# Patient Record
Sex: FEMALE | Race: BLACK OR AFRICAN AMERICAN | Employment: FULL TIME | URBAN - METROPOLITAN AREA
[De-identification: names, ages, dates, MRNs, and addresses within clinical notes are randomized per-mention and may not be internally consistent; named-entity substitution may affect disease eponyms.]

---

## 2022-03-03 ENCOUNTER — HOSPITAL ENCOUNTER (OUTPATIENT)
Dept: GENERAL RADIOLOGY | Age: 32
Discharge: HOME | End: 2022-03-03
Payer: COMMERCIAL

## 2022-03-03 ENCOUNTER — OFFICE VISIT (OUTPATIENT)
Dept: INTERNAL MEDICINE CLINIC | Age: 32
End: 2022-03-03
Payer: COMMERCIAL

## 2022-03-03 VITALS
RESPIRATION RATE: 17 BRPM | HEIGHT: 66 IN | DIASTOLIC BLOOD PRESSURE: 82 MMHG | BODY MASS INDEX: 47.09 KG/M2 | HEART RATE: 89 BPM | WEIGHT: 293 LBS | TEMPERATURE: 97.7 F | OXYGEN SATURATION: 100 % | SYSTOLIC BLOOD PRESSURE: 134 MMHG

## 2022-03-03 DIAGNOSIS — N64.89 BILATERAL PENDULOUS BREASTS: ICD-10-CM

## 2022-03-03 DIAGNOSIS — G89.29 CHRONIC RIGHT SHOULDER PAIN: ICD-10-CM

## 2022-03-03 DIAGNOSIS — G89.29 CHRONIC BILATERAL THORACIC BACK PAIN: ICD-10-CM

## 2022-03-03 DIAGNOSIS — R53.82 CHRONIC FATIGUE: ICD-10-CM

## 2022-03-03 DIAGNOSIS — R53.82 CHRONIC FATIGUE: Primary | ICD-10-CM

## 2022-03-03 DIAGNOSIS — F43.23 ADJUSTMENT DISORDER WITH MIXED ANXIETY AND DEPRESSED MOOD: ICD-10-CM

## 2022-03-03 DIAGNOSIS — G43.839 INTRACTABLE MENSTRUAL MIGRAINE WITHOUT STATUS MIGRAINOSUS: ICD-10-CM

## 2022-03-03 DIAGNOSIS — G56.01 RIGHT CARPAL TUNNEL SYNDROME: ICD-10-CM

## 2022-03-03 DIAGNOSIS — M25.511 CHRONIC RIGHT SHOULDER PAIN: ICD-10-CM

## 2022-03-03 DIAGNOSIS — M54.6 CHRONIC BILATERAL THORACIC BACK PAIN: ICD-10-CM

## 2022-03-03 DIAGNOSIS — Z13.220 SCREENING FOR LIPID DISORDERS: ICD-10-CM

## 2022-03-03 DIAGNOSIS — E66.01 SEVERE OBESITY (BMI >= 40) (HCC): ICD-10-CM

## 2022-03-03 DIAGNOSIS — Z76.89 ENCOUNTER TO ESTABLISH CARE: ICD-10-CM

## 2022-03-03 DIAGNOSIS — R63.5 WEIGHT GAIN: ICD-10-CM

## 2022-03-03 PROBLEM — G43.119 INTRACTABLE MIGRAINE WITH AURA WITHOUT STATUS MIGRAINOSUS: Status: ACTIVE | Noted: 2022-03-03

## 2022-03-03 PROCEDURE — 99204 OFFICE O/P NEW MOD 45 MIN: CPT | Performed by: NURSE PRACTITIONER

## 2022-03-03 PROCEDURE — 72072 X-RAY EXAM THORAC SPINE 3VWS: CPT

## 2022-03-03 RX ORDER — IBUPROFEN 800 MG/1
800 TABLET ORAL
Qty: 40 TABLET | Refills: 2 | Status: SHIPPED | OUTPATIENT
Start: 2022-03-03

## 2022-03-03 RX ORDER — VALACYCLOVIR HYDROCHLORIDE 500 MG/1
TABLET, FILM COATED ORAL
COMMUNITY
End: 2022-03-03

## 2022-03-03 RX ORDER — TOPIRAMATE 50 MG/1
50 TABLET, FILM COATED ORAL 2 TIMES DAILY
Qty: 60 TABLET | Refills: 2 | Status: SHIPPED | OUTPATIENT
Start: 2022-03-03 | End: 2022-06-01

## 2022-03-03 RX ORDER — ETONOGESTREL 68 MG/1
68 IMPLANT SUBCUTANEOUS
COMMUNITY

## 2022-03-03 RX ORDER — PHENTERMINE HYDROCHLORIDE 37.5 MG/1
TABLET ORAL
COMMUNITY
End: 2022-03-03

## 2022-03-03 RX ORDER — TOPIRAMATE 50 MG/1
TABLET, FILM COATED ORAL
COMMUNITY
End: 2022-03-03 | Stop reason: SDUPTHER

## 2022-03-03 RX ORDER — SERTRALINE HYDROCHLORIDE 25 MG/1
25 TABLET, FILM COATED ORAL
Qty: 30 TABLET | Refills: 2 | Status: SHIPPED | OUTPATIENT
Start: 2022-03-03 | End: 2022-03-28 | Stop reason: SDUPTHER

## 2022-03-03 RX ORDER — ALBUTEROL SULFATE 90 UG/1
AEROSOL, METERED RESPIRATORY (INHALATION)
COMMUNITY
End: 2022-03-03

## 2022-03-03 RX ORDER — CHLORTHALIDONE 25 MG/1
TABLET ORAL
Status: CANCELLED | OUTPATIENT
Start: 2022-03-03

## 2022-03-03 RX ORDER — CHLORTHALIDONE 25 MG/1
TABLET ORAL
COMMUNITY
End: 2022-03-03

## 2022-03-03 RX ORDER — VALACYCLOVIR HYDROCHLORIDE 500 MG/1
TABLET, FILM COATED ORAL
Status: CANCELLED | OUTPATIENT
Start: 2022-03-03

## 2022-03-03 NOTE — PROGRESS NOTES
Pt is here for   Chief Complaint   Patient presents with    New Patient     here to establish care     Weight Gain     pt states that she gained over 50 pounds since moving here in Aug     1. Have you been to the ER, urgent care clinic since your last visit? Hospitalized since your last visit? No    2. Have you seen or consulted any other health care providers outside of the [address] [place] [address] since your last visit? Include any pap smears or colon screening.  No    Denies pain at this time

## 2022-03-03 NOTE — PROGRESS NOTES
Avelina Farooq (: 1990) is a 33 y.o. female, new patient, here for evaluation of the following chief complaint(s):  New Patient (here to establish care ) and Weight Gain (pt states that she gained over 50 pounds since moving here in Aug)       ASSESSMENT/PLAN:  Below is the assessment and plan developed based on review of pertinent history, physical exam, labs, studies, and medications. 1. Chronic fatigue  -     MAGNESIUM; Future  -     VITAMIN B12 & FOLATE; Future  -     VITAMIN D, 25 HYDROXY; Future  -     T4 (THYROXINE); Future  -     TSH 3RD GENERATION; Future  -     HEMOGLOBIN A1C WITH EAG; Future  -     CBC WITH AUTOMATED DIFF; Future  -     METABOLIC PANEL, COMPREHENSIVE; Future  2. Severe obesity (BMI >= 40) (HCC)  -     topiramate (TOPAMAX) 50 mg tablet; Take 1 Tablet by mouth two (2) times a day., Normal, Disp-60 Tablet, R-2  -     REFERRAL TO WEIGHT LOSS  3. Encounter to establish care  4. Screening for lipid disorders  -     LIPID PANEL; Future  5. Chronic bilateral thoracic back pain  -     XR SPINE THORAC 3 V; Future  6. Right carpal tunnel syndrome  -     XR SPINE THORAC 3 V; Future  7. Weight gain  8. Intractable menstrual migraine without status migrainosus  -     topiramate (TOPAMAX) 50 mg tablet; Take 1 Tablet by mouth two (2) times a day., Normal, Disp-60 Tablet, R-2  -     ibuprofen (MOTRIN) 800 mg tablet; Take 1 Tablet by mouth every eight (8) hours as needed for Pain., Normal, Disp-40 Tablet, R-2  -     MAGNESIUM; Future  -     VITAMIN D, 25 HYDROXY; Future  -     TSH 3RD GENERATION; Future  9. Chronic right shoulder pain  -     ibuprofen (MOTRIN) 800 mg tablet; Take 1 Tablet by mouth every eight (8) hours as needed for Pain., Normal, Disp-40 Tablet, R-2  10. Adjustment disorder with mixed anxiety and depressed mood  -     sertraline (Zoloft) 25 mg tablet;  Take 1 Tablet by mouth nightly., Normal, Disp-30 Tablet, R-2  -     REFERRAL TO PSYCHOLOGY  11. Bilateral pendulous breasts      Return for VV- ADJ dis med/counseling start. Pt asked to complete follow by next visit: lose weight, increase physical activity, referred to [facility] and for counseling. Trial of zoloft. Resumed topamax to incite wt loss and mitigate migraine HAs. SUBJECTIVE/OBJECTIVE:  HPI    Pt here to establish care. 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 Has no social network here in [place], friends live in other states. Feels change in mood, cries more now, and eating more. Has gained ~ 50 lbs since moving. Has lack of motivation lately. For past few months has worked out 2 times weekly, but weight training only. Has lost 80 lbs in the past, but unable to get motivated now, feels so tired most days. Took adipex and chlorthalidone for a few months to aide in wt loss. Also take topamax for migraine HAs, but improved greatly after Nexplanon inserted. Has numbness and tingling, but told at one pt from [facility] she has right carparl tunnel. Seen at that time for right shoulder/neck pain related to large pendulous breast. Wore size H at the time. MRI of CSP done with xray and told no issues noted. However feels pain  In mid back and right shoulder is solely due to weight of breast. Was in process of approval for breast reduction before COVID. Advised to lose wt, but recalls only dropping one cup size with 80 lb wt loss. Feels breast are bigger and heavier now, has NOT gotten new bras yet.   3 most recent PHQ Screens 3/3/2022   Little interest or pleasure in doing things Nearly every day   Feeling down, depressed, irritable, or hopeless More than half the days   Total Score PHQ 2 5   Trouble falling or staying asleep, or sleeping too much More than half the days   Feeling tired or having little energy Nearly every day   Poor appetite, weight loss, or overeating Nearly every day   Feeling bad about yourself - or that you are a failure or have let yourself or your family down Nearly every day   Trouble concentrating on things such as school, work, reading, or watching TV Nearly every day   Moving or speaking so slowly that other people could have noticed; or the opposite being so fidgety that others notice More than half the days   Thoughts of being better off dead, or hurting yourself in some way Not at all   PHQ 9 Score 21   How difficult have these problems made it for you to do your work, take care of your home and get along with others Very difficult           Review of Systems  Constitutional: negative for fevers, chills, anorexia and weight loss  Respiratory:  negative for cough, hemoptysis, dyspnea, and wheezing  CV:   negative for chest pain, palpitations, and lower extremity edema  GI:   negative for nausea, vomiting, diarrhea, abdominal pain, and melena  Endo:               negative for polyuria,polydipsia,polyphagia, and heat intolerance  Genitourinary: negative for frequency, urgency, dysuria, retention, and hematuria  Integument:  negative for rash, ulcerations, and pruritus  Hematologic:  negative for easy bruising and bleeding  Musculoskel: negative for arthralgias, muscle weakness,and joint pain/swelling  Neurological:  negative for headaches, dizziness, vertigo,and memory/gait problems  Behavl/Psych: negative for feelings of anxiety, depression, suicide, and mood changes    Visit Vitals  /82 (BP 1 Location: Right upper arm, BP Patient Position: Sitting, BP Cuff Size: Thigh)   Pulse 89   Temp 97.7 °F (36.5 °C) (Temporal)   Resp 17   Ht 5' 6\" (1.676 m)   Wt 300 lb (136.1 kg)   LMP 03/01/2022 (Exact Date)   SpO2 100%   BMI 48.42 kg/m²       Wt Readings from Last 3 Encounters:   03/03/22 300 lb (136.1 kg)         Physical Exam:   General appearance - alert, well appearing, and in no distress. Mental status - A/O x 4,sad mood and affect. +tearful. Chest - CTA. Symmetric chest rise. No wheezing. No distress. Heart - Normal rate & rhythm.  Normal S1 & S2. No MGR. Abdomen- Soft, round. Non-distended, NT. No pulsatile masses or hernias. Ext-  No pedal edema, clubbing, or cyanosis. Skin-Warm and dry. No hyperpigmentation, ulcerations, or suspicious lesions. Neuro - Normal speech, no focal findings or movement disorder. Normal strength, gait, and muscle tone. An electronic signature was used to authenticate this note.   -- Eyad Altamirano, NP

## 2022-03-03 NOTE — PATIENT INSTRUCTIONS
Adjustment Disorder: Care Instructions  Your Care Instructions     Adjustment disorder means that you have emotional or behavioral problems because of stress. But your response to the stress is far more severe than a normal response. It is severe enough to affect your work or social life and may cause depression and physical pains and problems. Events that may cause this response can include a divorce, money problems, or starting school or a new job. It might be anything that causes some stress. This disorder is most often a short-term problem. It happens within 3 months of the stressful event or change. If the response lasts longer than 6 months after the event ends, you may have a more serious disorder. Follow-up care is a key part of your treatment and safety. Be sure to make and go to all appointments, and call your doctor if you are having problems. It's also a good idea to know your test results and keep a list of the medicines you take. How can you care for yourself at home? · Go to all counseling sessions. Do not skip any because you are feeling better. · If your doctor prescribed medicines, take them exactly as prescribed. Call your doctor if you think you are having a problem with your medicine. You will get more details on the specific medicines your doctor prescribes. · Discuss the causes of your stress with a good friend or family member. Or you can join a support group for people with similar problems. Talking to others sometimes relieves stress. · Get at least 30 minutes of exercise on most days of the week. Walking is a good choice. You also may want to do other activities, such as running, swimming, cycling, or playing tennis or team sports. Relaxation techniques  Do relaxation exercises 10 to 20 minutes a day. You can play soothing, relaxing music while you do them, if you wish. · Tell others in your house that you are going to do your relaxation exercises.  Ask them not to disturb you.  · Find a comfortable, quiet place. · Lie down on your back, or sit with your back straight. · Focus on your breathing. Make it slow and steady. · Breathe in through your nose. Breathe out through either your nose or mouth. · Breathe deeply, filling up the area between your navel and your rib cage. Breathe so that your belly goes up and down. · Do not hold your breath. · Breathe like this for 5 to 10 minutes. Notice the feeling of calmness throughout your whole body. As you continue to breathe slowly and deeply, relax by doing these next steps for another 5 to 10 minutes:  · Tighten and relax each muscle group in your body. Start at your toes, and work your way up to your head. · Imagine your muscle groups relaxing and getting heavy. · Empty your mind of all thoughts. · Let yourself relax more and more deeply. · Be aware of the state of calmness that surrounds you. · When your relaxation time is over, you can bring yourself back to alertness by moving your fingers and toes. Then move your hands and feet. And then move your entire body. Sometimes people fall asleep during relaxation. But they most often wake up soon. · Always give yourself time to return to full alertness before you drive a car. Wait to do anything that might cause an accident if you are not fully alert. Never play a relaxation tape while you drive a car. When should you call for help? Call 911 anytime you think you may need emergency care. For example, call if:    · You feel you cannot stop from hurting yourself or someone else. Keep the numbers for these national suicide hotlines: 0-061-234-TALK (3-382-793-394-596-4487) and 2-557-DPSSIKL (1-472.960.7390). If you or someone you know talks about suicide or feeling hopeless, get help right away.    Watch closely for changes in your health, and be sure to contact your doctor if:    · You have new anxiety, or your anxiety gets worse.     · You have been feeling sad, depressed, or hopeless or have lost interest in things that you usually enjoy.     · You do not get better as expected. Where can you learn more? Go to http://www.gray.com/  Enter R087 in the search box to learn more about \"Adjustment Disorder: Care Instructions. \"  Current as of: June 16, 2021               Content Version: 13.0  © 2432-6403 [employer]. Care instructions adapted under license by [employer] (which disclaims liability or warranty for this information). If you have questions about a medical condition or this instruction, always ask your healthcare professional. [place] [address] any warranty or liability for your use of this information.

## 2022-03-04 LAB
25(OH)D3 SERPL-MCNC: 45.3 NG/ML (ref 30–100)
ALBUMIN SERPL-MCNC: 3.5 G/DL (ref 3.5–5)
ALBUMIN/GLOB SERPL: 0.8 {RATIO} (ref 1.1–2.2)
ALP SERPL-CCNC: 75 U/L (ref 45–117)
ALT SERPL-CCNC: 24 U/L (ref 12–78)
ANION GAP SERPL CALC-SCNC: 4 MMOL/L (ref 5–15)
AST SERPL-CCNC: 16 U/L (ref 15–37)
BASOPHILS # BLD: 0 K/UL (ref 0–0.1)
BASOPHILS NFR BLD: 1 % (ref 0–1)
BILIRUB SERPL-MCNC: 0.2 MG/DL (ref 0.2–1)
BUN SERPL-MCNC: 17 MG/DL (ref 6–20)
BUN/CREAT SERPL: 16 (ref 12–20)
CALCIUM SERPL-MCNC: 9.6 MG/DL (ref 8.5–10.1)
CHLORIDE SERPL-SCNC: 107 MMOL/L (ref 97–108)
CHOLEST SERPL-MCNC: 179 MG/DL
CO2 SERPL-SCNC: 26 MMOL/L (ref 21–32)
COMMENT, HOLDF: NORMAL
CREAT SERPL-MCNC: 1.06 MG/DL (ref 0.55–1.02)
DIFFERENTIAL METHOD BLD: ABNORMAL
EOSINOPHIL # BLD: 0.2 K/UL (ref 0–0.4)
EOSINOPHIL NFR BLD: 4 % (ref 0–7)
ERYTHROCYTE [DISTWIDTH] IN BLOOD BY AUTOMATED COUNT: 14.6 % (ref 11.5–14.5)
EST. AVERAGE GLUCOSE BLD GHB EST-MCNC: 111 MG/DL
FOLATE SERPL-MCNC: 43.3 NG/ML (ref 5–21)
GLOBULIN SER CALC-MCNC: 4.2 G/DL (ref 2–4)
GLUCOSE SERPL-MCNC: 98 MG/DL (ref 65–100)
HBA1C MFR BLD: 5.5 % (ref 4–5.6)
HCT VFR BLD AUTO: 39.6 % (ref 35–47)
HDLC SERPL-MCNC: 71 MG/DL
HDLC SERPL: 2.5 {RATIO} (ref 0–5)
HGB BLD-MCNC: 12.4 G/DL (ref 11.5–16)
IMM GRANULOCYTES # BLD AUTO: 0 K/UL (ref 0–0.04)
IMM GRANULOCYTES NFR BLD AUTO: 0 % (ref 0–0.5)
LDLC SERPL CALC-MCNC: 96.4 MG/DL (ref 0–100)
LYMPHOCYTES # BLD: 1.7 K/UL (ref 0.8–3.5)
LYMPHOCYTES NFR BLD: 39 % (ref 12–49)
MAGNESIUM SERPL-MCNC: 2.1 MG/DL (ref 1.6–2.4)
MCH RBC QN AUTO: 25.6 PG (ref 26–34)
MCHC RBC AUTO-ENTMCNC: 31.3 G/DL (ref 30–36.5)
MCV RBC AUTO: 81.8 FL (ref 80–99)
MONOCYTES # BLD: 0.3 K/UL (ref 0–1)
MONOCYTES NFR BLD: 6 % (ref 5–13)
NEUTS SEG # BLD: 2.2 K/UL (ref 1.8–8)
NEUTS SEG NFR BLD: 50 % (ref 32–75)
NRBC # BLD: 0 K/UL (ref 0–0.01)
NRBC BLD-RTO: 0 PER 100 WBC
PLATELET # BLD AUTO: 281 K/UL (ref 150–400)
PMV BLD AUTO: 12.2 FL (ref 8.9–12.9)
POTASSIUM SERPL-SCNC: 4.3 MMOL/L (ref 3.5–5.1)
PROT SERPL-MCNC: 7.7 G/DL (ref 6.4–8.2)
RBC # BLD AUTO: 4.84 M/UL (ref 3.8–5.2)
SAMPLES BEING HELD,HOLD: NORMAL
SODIUM SERPL-SCNC: 137 MMOL/L (ref 136–145)
T4 SERPL-MCNC: 11.4 UG/DL (ref 4.8–13.9)
TRIGL SERPL-MCNC: 58 MG/DL (ref ?–150)
TSH SERPL DL<=0.05 MIU/L-ACNC: 1.35 UIU/ML (ref 0.36–3.74)
VIT B12 SERPL-MCNC: 1800 PG/ML (ref 193–986)
VLDLC SERPL CALC-MCNC: 11.6 MG/DL
WBC # BLD AUTO: 4.4 K/UL (ref 3.6–11)

## 2022-03-07 NOTE — PROGRESS NOTES
NML/Stable labs, no changes. We can discuss at the next OV, if pt would like.        Thanks, [employer], KARLEYC.

## 2022-03-18 PROBLEM — G89.29 CHRONIC RIGHT SHOULDER PAIN: Status: ACTIVE | Noted: 2022-03-03

## 2022-03-18 PROBLEM — E66.01 SEVERE OBESITY (BMI >= 40) (HCC): Status: ACTIVE | Noted: 2022-03-03

## 2022-03-18 PROBLEM — M25.511 CHRONIC RIGHT SHOULDER PAIN: Status: ACTIVE | Noted: 2022-03-03

## 2022-03-18 PROBLEM — G89.29 CHRONIC BILATERAL THORACIC BACK PAIN: Status: ACTIVE | Noted: 2022-03-03

## 2022-03-18 PROBLEM — R53.82 CHRONIC FATIGUE: Status: ACTIVE | Noted: 2022-03-03

## 2022-03-18 PROBLEM — M54.6 CHRONIC BILATERAL THORACIC BACK PAIN: Status: ACTIVE | Noted: 2022-03-03

## 2022-03-18 PROBLEM — G56.01 RIGHT CARPAL TUNNEL SYNDROME: Status: ACTIVE | Noted: 2022-03-03

## 2022-03-18 PROBLEM — F43.23 ADJUSTMENT DISORDER WITH MIXED ANXIETY AND DEPRESSED MOOD: Status: ACTIVE | Noted: 2022-03-03

## 2022-03-19 PROBLEM — G43.119 INTRACTABLE MIGRAINE WITH AURA WITHOUT STATUS MIGRAINOSUS: Status: ACTIVE | Noted: 2022-03-03

## 2022-03-19 PROBLEM — N64.89 BILATERAL PENDULOUS BREASTS: Status: ACTIVE | Noted: 2022-03-03

## 2022-03-19 PROBLEM — G43.839 INTRACTABLE MENSTRUAL MIGRAINE WITHOUT STATUS MIGRAINOSUS: Status: ACTIVE | Noted: 2022-03-03

## 2022-03-28 ENCOUNTER — VIRTUAL VISIT (OUTPATIENT)
Dept: INTERNAL MEDICINE CLINIC | Age: 32
End: 2022-03-28
Payer: COMMERCIAL

## 2022-03-28 DIAGNOSIS — E66.01 SEVERE OBESITY (BMI >= 40) (HCC): ICD-10-CM

## 2022-03-28 DIAGNOSIS — F43.23 ADJUSTMENT DISORDER WITH MIXED ANXIETY AND DEPRESSED MOOD: Primary | ICD-10-CM

## 2022-03-28 DIAGNOSIS — R53.82 CHRONIC FATIGUE: ICD-10-CM

## 2022-03-28 PROCEDURE — 99214 OFFICE O/P EST MOD 30 MIN: CPT | Performed by: NURSE PRACTITIONER

## 2022-03-28 RX ORDER — SERTRALINE HYDROCHLORIDE 50 MG/1
50 TABLET, FILM COATED ORAL
Qty: 90 TABLET | Refills: 3 | Status: SHIPPED | OUTPATIENT
Start: 2022-03-28 | End: 2022-06-01 | Stop reason: ALTCHOICE

## 2022-03-28 NOTE — PROGRESS NOTES
Pt is here for   Chief Complaint   Patient presents with    Follow-up     Adj dis med/counseling start DOXY. -[phone]     1. Have you been to the ER, urgent care clinic since your last visit? Hospitalized since your last visit? No    2. Have you seen or consulted any other health care providers outside of the [address] [place] [address] since your last visit? Include any pap smears or colon screening.  No    Denies pain at this time

## 2022-03-28 NOTE — Clinical Note
Morning, it seems this pt was not contacted (per her report) for orientation. Could you please reach out to her?

## 2022-03-28 NOTE — PATIENT INSTRUCTIONS
Learning About Guided Imagery for Stress  What are guided imagery and stress? Stress is what you feel when you have to handle more than you're used to. A lot of things can cause stress. You may feel stress when you go on a job interview, take a test, or run a race. This kind of short-term stress is normal and even useful. It can help you if you need to work hard or react quickly. Stress also can last a long time. Long-term stress is caused by stressful situations or events. Examples include long-term health problems, ongoing problems at work, and conflicts in your family. Long-term stress can harm your health. Guided imagery is a technique of directed thoughts and suggestions that guide your mind toward a relaxed, focused state. This technique helps you use your mind to take you to a calm, peaceful place. You can use it to relax, which can lower blood pressure and reduce other problems related to stress. How does guided imagery help to relieve stress? Because of the way the mind and body are connected, guided imagery can make you feel like you are experiencing something just by imagining it. You can achieve a relaxed state when you imagine all the details of a safe, comfortable place, such as a beach or a garden. This relaxed state may help you feel more in control of your emotions and thought processes. Feeling in control may improve your attitude, health, and sense of well-being. How do you do guided imagery? You can use a smartphone ariela or a video to lead you through the process. You use all of your senses in guided imagery. For example, if you want a tropical setting, you can imagine the warm breeze on your skin, the bright blue of the water, the sound of the surf, the sweet scent of tropical flowers, and the taste of coconut. Imagining those things can make you actually feel like you're there. But you don't have to imagine the tropics to feel peace.  Guided imagery can take you to your own restful place. To give guided imagery a try, follow these steps:  1. Lean back comfortably in your chair. If you can, close your eyes. Put your arms on the armrests, or fold your hands in your lap. 2. Take a deep breath through your nose. Breathe in slowly, and then let the air out completely through your mouth. 3. Do it again slowly. Keep breathing like this. Gather up any tension in your body, and send it out with every breath. 4. Let a feeling of warmth spread from your lungs to your neck and head, down your arms to your fingertips, through your body and into your legs, all the way down to your toes. Stay this way for a moment. 5. Now imagine a pleasant day. You're at a park or at a place you've visited in the past where you felt at peace. 6. In your mind's eye, look at what lies in front of you. Maybe you see the sun, filtered through trees. Maybe clouds are drifting by.  7. Look to one side, and then the other. Notice the feel of the air around you. Notice how it feels on your face and on your arms. 8. Stay here for a while. Let it become real for you. Follow-up care is a key part of your treatment and safety. Be sure to make and go to all appointments, and call your doctor if you are having problems. It's also a good idea to know your test results and keep a list of the medicines you take. Where can you learn more? Go to http://www.gray.com/  Enter N291 in the search box to learn more about \"Learning About Guided Imagery for Stress. \"  Current as of: June 16, 2021               Content Version: 13.2  © 1266-2345 [employer]. Care instructions adapted under license by [employer] (which disclaims liability or warranty for this information). If you have questions about a medical condition or this instruction, always ask your healthcare professional. [address] any warranty or liability for your use of this information.

## 2022-03-28 NOTE — PROGRESS NOTES
Steven Solomon is a 33 y.o. female established patient, here for evaluation of the following chief complaint(s):   Follow-up (Adj dis med/counseling start DOXY. -[phone])          Assessment & Plan:   Diagnoses and all orders for this visit:    1. Adjustment disorder with mixed anxiety and depressed mood  -     REFERRAL TO PSYCHOLOGY  -     sertraline (Zoloft) 50 mg tablet; Take 1 Tablet by mouth nightly. 2. Chronic fatigue    3. Severe obesity (BMI >= 40) ([facility])      Follow-up and Dispositions    · Return in about 4 weeks (around 4/25/2022) for VV- ADJ/MDD fu. Needs VV with Ethelene Debra ASAP please. Sherryle Moder Routing History           We discussed the expected course, resolution and complications of the diagnosis(es) in detail. Medication risks, benefits, costs, interactions, and alternatives were discussed as indicated. I advised her to contact the office if her condition worsens, changes or fails to improve as anticipated. She expressed understanding with the diagnosis(es) and plan. Specific pt instructions until next visit: start counseling with Ethelene Debra and INCREASED Zoloft to 50 mg. Sent msg to [facility] since pt reports NOT being contacted for orientation since last visit. Subjective:   Steven Solomon is a 33 y.o. female who was seen for Follow-up (Adj dis med/counseling start DOXY. -[phone])      Pt presents to f/u adjustment disorder. Taking Zoloft 25 mg. Denies side effects from medication. Feels the same, but crying less, sleeping more on off days however.  Has NOT called for therapy appt, has noticed lack of motivation to take care of home and grocery shop.   3 most recent [facility] 3/28/2022   Little interest or pleasure in doing things More than half the days   Feeling down, depressed, irritable, or hopeless More than half the days   Total Score PHQ 2 4   Trouble falling or staying asleep, or sleeping too much Several days   Feeling tired or having little energy Nearly every day   Poor appetite, weight loss, or overeating Nearly every day   Feeling bad about yourself - or that you are a failure or have let yourself or your family down More than half the days   Trouble concentrating on things such as school, work, reading, or watching TV More than half the days   Moving or speaking so slowly that other people could have noticed; or the opposite being so fidgety that others notice More than half the days   Thoughts of being better off dead, or hurting yourself in some way Not at all   PHQ 9 Score 17   How difficult have these problems made it for you to do your work, take care of your home and get along with others Very difficult         Patient Active Problem List    Diagnosis Date Noted    Bilateral pendulous breasts 03/03/2022    Adjustment disorder with mixed anxiety and depressed mood 03/03/2022    Chronic right shoulder pain 03/03/2022    Intractable menstrual migraine without status migrainosus 03/03/2022    Intractable migraine with aura without status migrainosus 03/03/2022    Right carpal tunnel syndrome 03/03/2022    Chronic bilateral thoracic back pain 03/03/2022    Chronic fatigue 03/03/2022    Severe obesity (BMI >= 40) ([facility] [address] 03/03/2022     Current Outpatient Medications   Medication Sig Dispense Refill    sertraline (Zoloft) 50 mg tablet Take 1 Tablet by mouth nightly. 90 Tablet 3    etonogestreL (Nexplanon) 68 mg impl 68 mg.  topiramate (TOPAMAX) 50 mg tablet Take 1 Tablet by mouth two (2) times a day. 60 Tablet 2    ibuprofen (MOTRIN) 800 mg tablet Take 1 Tablet by mouth every eight (8) hours as needed for Pain. 40 Tablet 2     Allergies   Allergen Reactions    Cat Dander Hives     History reviewed. No pertinent past medical history. History reviewed. No pertinent surgical history. Review of Systems   Constitutional: Negative for fever and malaise/fatigue. Eyes: Negative for blurred vision. Respiratory: Negative for cough and shortness of breath. Cardiovascular: Negative for chest pain and leg swelling. Neurological: Negative for dizziness, weakness and headaches. Objective:   Vital Signs: (As obtained by patient/caregiver at home)  Visit Vitals  LMP 03/01/2022 (Exact Date)              Physical Exam:  General appearance - alert, well  appearing, and in mild distress. Mental status - A/O x 4,sad mood and FLAT affect. +tearful at end  Eyes- trace periorbital edema, drainage, or irritation noted. Nose- no obvious drainage or swelling. Throat- no obvious swelling, goiter, or notable lymphadenopathy  Chest - Symmetric chest rise. No wheezing or coughing. No distress. Skin- normal skin tone noted. No hyperpigmentation or obvious deformities. No diaphoresis noted. No flushing. Neuro - Normal speech, no focal findings or movement disorder. Other pertinent observable physical exam findings:-              Asiya Patel is being evaluated by a Virtual Visit (video visit) encounter to address concerns as mentioned above. A caregiver was present when appropriate. Due to this being a TeleHealth encounter (During [facility]- public health emergency), evaluation of the following organ systems was limited: Vitals/Constitutional/EENT/Resp/CV/GI//MS/Neuro/Skin/Heme-Lymph-Imm. Pursuant to the emergency declaration under the [address] [place] [address] authority and the [employer] and [employer] Act, this Virtual Visit was conducted with patient's (and/or legal guardian's) consent, to reduce the patient's risk of exposure to COVID-19 and provide necessary medical care. The patient (and/or legal guardian) has also been advised to contact this office for worsening conditions or problems, and seek emergency medical treatment and/or call 911 if deemed necessary.     Patient identification was verified at the start of the visit: YES    Services were provided through a video synchronous discussion virtually to substitute for in-person clinic visit. Patient and provider were located at their individual homes. An electronic signature was used to authenticate this note.   -- Chun Case NP

## 2022-03-30 DIAGNOSIS — E66.01 MORBID OBESITY (HCC): ICD-10-CM

## 2022-03-30 DIAGNOSIS — Z76.89 ENCOUNTER FOR WEIGHT MANAGEMENT: Primary | ICD-10-CM

## 2022-05-02 ENCOUNTER — VIRTUAL VISIT (OUTPATIENT)
Dept: INTERNAL MEDICINE CLINIC | Age: 32
End: 2022-05-02
Payer: COMMERCIAL

## 2022-05-02 DIAGNOSIS — F43.23 ADJUSTMENT DISORDER WITH MIXED ANXIETY AND DEPRESSED MOOD: Primary | ICD-10-CM

## 2022-05-02 PROCEDURE — 99212 OFFICE O/P EST SF 10 MIN: CPT | Performed by: NURSE PRACTITIONER

## 2022-05-02 NOTE — PROGRESS NOTES
Ranjana Gresham is a 27 y.o. female established patient, here for evaluation of the following chief complaint(s):   Follow-up (4 weeks for ADJ/ KAREN)          Assessment & Plan:   Diagnoses and all orders for this visit:    1. Adjustment disorder with mixed anxiety and depressed mood      Follow-up and Dispositions    · Return in about 4 weeks (around 5/30/2022) for VV- KAREN/MDD fu. Needs another VV with [address] also. .           We discussed the expected course, resolution and complications of the diagnosis(es) in detail. Medication risks, benefits, costs, interactions, and alternatives were discussed as indicated. I advised her to contact the office if her condition worsens, changes or fails to improve as anticipated. She expressed understanding with the diagnosis(es) and plan. Specific pt instructions until next visit:no med changes. Have another counseling session, may start trintellix at fu [facility]    Subjective:   Ranjana Gresham is a 27 y.o. female who was seen for Follow-up (4 weeks for ADJ/ KAREN)      Pt presents to f/u adjustment disorder/KAREN counseling start and dose increase. Had counseling session. Taking Zoloft 50 mg. Denies side effects from medication. Feels the crying has RESOLVED, but still sleeping more on off days however. Feels calmer and less overwhelmed by issues.   3 most recent PHQ Screens 5/2/2022   Little interest or pleasure in doing things Several days   Feeling down, depressed, irritable, or hopeless Several days   Total Score PHQ 2 2   Trouble falling or staying asleep, or sleeping too much More than half the days   Feeling tired or having little energy More than half the days   Poor appetite, weight loss, or overeating More than half the days   Feeling bad about yourself - or that you are a failure or have let yourself or your family down Several days   Trouble concentrating on things such as school, work, reading, or watching TV Several days   Moving or speaking so slowly that other people could have noticed; or the opposite being so fidgety that others notice Several days   Thoughts of being better off dead, or hurting yourself in some way Not at all   PHQ 9 Score 11   How difficult have these problems made it for you to do your work, take care of your home and get along with others Somewhat difficult     KAREN 2/7 5/2/2022   Feeling nervous, anxious, or on edge 2   Not being able to stop or control worrying 2   Worrying too much about different things 1   Trouble relaxing 3   Being so restless that it is hard to sit still 2   Becoming easily annoyed or irritable 0   Feeling afraid as if something awful might happen 1   KAREN-7 Total Score 11             Patient Active Problem List    Diagnosis Date Noted    Bilateral pendulous breasts 03/03/2022    Adjustment disorder with mixed anxiety and depressed mood 03/03/2022    Chronic right shoulder pain 03/03/2022    Intractable menstrual migraine without status migrainosus 03/03/2022    Intractable migraine with aura without status migrainosus 03/03/2022    Right carpal tunnel syndrome 03/03/2022    Chronic bilateral thoracic back pain 03/03/2022    Chronic fatigue 03/03/2022    Severe obesity (BMI >= 40) ([facility] [address] 03/03/2022     Current Outpatient Medications   Medication Sig Dispense Refill    sertraline (Zoloft) 50 mg tablet Take 1 Tablet by mouth nightly. 90 Tablet 3    etonogestreL (Nexplanon) 68 mg impl 68 mg.  topiramate (TOPAMAX) 50 mg tablet Take 1 Tablet by mouth two (2) times a day. 60 Tablet 2    ibuprofen (MOTRIN) 800 mg tablet Take 1 Tablet by mouth every eight (8) hours as needed for Pain. 40 Tablet 2     Allergies   Allergen Reactions    Cat Dander Hives     No past medical history on file. No past surgical history on file. Review of Systems   Constitutional: Negative for fever and malaise/fatigue. Eyes: Negative for blurred vision. Respiratory: Negative for cough and shortness of breath.     Cardiovascular: Negative for chest pain and leg swelling. Neurological: Negative for dizziness, weakness and headaches. Objective:   Vital Signs: (As obtained by patient/caregiver at home)  There were no vitals taken for this visit. Physical Exam:  General appearance - alert, well  appearing, and in mild distress. Mental status - A/O x 4,sad mood and FLAT affect. +tearful at end  Eyes- trace periorbital edema, drainage, or irritation noted. Nose- no obvious drainage or swelling. Throat- no obvious swelling, goiter, or notable lymphadenopathy  Chest - Symmetric chest rise. No wheezing or coughing. No distress. Skin- normal skin tone noted. No hyperpigmentation or obvious deformities. No diaphoresis noted. No flushing. Neuro - Normal speech, no focal findings or movement disorder. Other pertinent observable physical exam findings:-              Karla Arrington is being evaluated by a Virtual Visit (video visit) encounter to address concerns as mentioned above. A caregiver was present when appropriate. Due to this being a TeleHealth encounter ([facility]), evaluation of the following organ systems was limited: Vitals/Constitutional/EENT/Resp/CV/GI//MS/Neuro/Skin/Heme-Lymph-Imm. Pursuant to the emergency declaration under the [address] [place] [address] authority and the [employer] Act, this Virtual Visit was conducted with patient's (and/or legal guardian's) consent, to reduce the patient's risk of exposure to COVID-19 and provide necessary medical care. The patient (and/or legal guardian) has also been advised to contact this office for worsening conditions or problems, and seek emergency medical treatment and/or call 911 if deemed necessary.     Patient identification was verified at the start of the visit: YES    Services were provided through a video synchronous discussion virtually to substitute for in-person clinic visit. Patient and provider were located at their individual homes. An electronic signature was used to authenticate this note.   -- Caleb Richard NP

## 2022-05-02 NOTE — PATIENT INSTRUCTIONS
Vortioxetine (By mouth)   Vortioxetine ([id number])  Treats depression. Brand Name(s): Trintellix   There may be other brand names for this medicine. When This Medicine Should Not Be Used: This medicine is not right for everyone. Do not use it if you had an allergic reaction to vortioxetine. How to Use This Medicine:   Tablet  · Take your medicine as directed. Your dose may need to be changed several times to find what works best for you. · Take this medicine at the same time each day. · This medicine should come with a Medication Guide. Ask your pharmacist for a copy if you do not have one. · Missed dose: Take a dose as soon as you remember. If it is almost time for your next dose, wait until then and take a regular dose. Do not take extra medicine to make up for a missed dose. · Store the medicine in a closed container at room temperature, away from heat, moisture, and direct light. Drugs and Foods to Avoid:   Ask your doctor or pharmacist before using any other medicine, including over-the-counter medicines, vitamins, and herbal products. · Do not take vortioxetine if you have taken a MAO inhibitor, methylene blue, or linezolid in the past 2 weeks. Do not start taking an MAO inhibitor within 21 days of stopping vortioxetine. · Some medicines can affect how vortioxetine works. Tell your doctor if you are using the following:   ¨ Carbamazepine, fentanyl, lithium, phenytoin, quinidine, rifampin, tramadol, Scottie's wort  ¨ A diuretic (water pill), triptan medicine for migraine headaches, a tryptophan supplement, other medicine for depression (such as buspirone, bupropion, fluoxetine, paroxetine), an NSAID pain or arthritis medicine (such as aspirin, celecoxib, diclofenac, ibuprofen, naproxen), or a blood thinner (such as warfarin)  · Do not drink alcohol while you are using this medicine.   Warnings While Using This Medicine:   · Tell your doctor if you are pregnant or breastfeeding, or if you have liver disease or glaucoma. · For some children, teenagers, and young adults, this medicine may increase mental or emotional problems. This may lead to thoughts of suicide and violence. Talk with your doctor right away if you have any thoughts or behavior changes that concern you. Tell your doctor if you or anyone in your family has a history of bipolar disorder or suicide attempts. · This medicine may cause the following problems:  ¨ Serotonin syndrome (more likely when used with certain other medicines)  ¨ Increased risk of bleeding  ¨ Low sodium levels in the blood  · This medicine may make you dizzy. Do not drive or do anything that could be dangerous until you know how this medicine affects you. · Do not stop using this medicine suddenly. Your doctor will need to slowly decrease your dose before you stop it completely. · Your doctor will check your progress and the effects of this medicine at regular visits. Keep all appointments. · Keep all medicine out of the reach of children. Never share your medicine with anyone.   Possible Side Effects While Using This Medicine:   Call your doctor right away if you notice any of these side effects:  · Allergic reaction: Itching or hives, swelling in your face or hands, swelling or tingling in your mouth or throat, chest tightness, trouble breathing  · Anxiety, restlessness, fast heartbeat, fever, sweating, muscle spasms, nausea, vomiting, diarrhea, seeing or hearing things that are not there  · Confusion, weakness, and muscle stiffness or twitching  · Feeling more excited or energetic than usual  · Thoughts of hurting yourself or others, unusual behavior  · Trouble sleeping, unusual dreams  · Unusual bleeding or bruising  If you notice these less serious side effects, talk with your doctor:   · Dizziness  · Eye pain, vision changes, seeing halos around lights  · Nausea, vomiting, constipation  · Sexual problems  If you notice other side effects that you think are caused by this medicine, tell your doctor. Call your doctor for medical advice about side effects. You may report side effects to FDA at 6-043-FJA-8386  © 2017 [facility] Information is for End User's use only and may not be sold, redistributed or otherwise used for commercial purposes. The above information is an  only. It is not intended as medical advice for individual conditions or treatments. Talk to your doctor, nurse or pharmacist before following any medical regimen to see if it is safe and effective for you.

## 2022-05-02 NOTE — PROGRESS NOTES
Pt is here for   Chief Complaint   Patient presents with    Follow-up     4 weeks for ADJ/ KAREN     1. Have you been to the ER, urgent care clinic since your last visit? Hospitalized since your last visit? No    2. Have you seen or consulted any other health care providers outside of the [address] [place] [address] since your last visit? Include any pap smears or colon screening.  No

## 2022-05-09 ENCOUNTER — VIRTUAL VISIT (OUTPATIENT)
Dept: BEHAVIORAL/MENTAL HEALTH CLINIC | Age: 32
End: 2022-05-09
Payer: COMMERCIAL

## 2022-05-09 DIAGNOSIS — F43.23 ADJUSTMENT DISORDER WITH MIXED ANXIETY AND DEPRESSED MOOD: Primary | ICD-10-CM

## 2022-05-09 PROCEDURE — 90837 PSYTX W PT 60 MINUTES: CPT | Performed by: SOCIAL WORKER

## 2022-05-10 NOTE — PROGRESS NOTES
History of Present Illness: Sindi Williamson is a 33 y.o. female who presents with symptoms of depression and anxiety     Duration of session: 57+ min     Mental Status exam:           Sensorium  oriented to time, place and person   Relations cooperative   Appearance:  age appropriate   Motor Behavior:  within normal limits   Speech:  normal pitch and normal volume   Thought Process: goal directed   Thought Content free of delusions, free of hallucinations and not internally preoccupied    Suicidal ideations none   Homicidal ideations none   Mood:  wnl   Affect:  stable, mood-congruent    Memory recent  impaired   Memory remote:  adequate   Concentration:  impaired   Abstraction:  abstract   Insight:  good   Reliability good   Judgment:  good            DIAGNOSIS AND IMPRESSION:        Axis I: r/o mdd and Adjustment Disorder with Mixed Emotional Features  Axis II: No diagnosis  Axis III: History reviewed. No pertinent past medical history. Axis IV: Problems related to social environment, Occupational problems and Other psychosocial or environmental problems  Axis V:  61-70 mild symptoms        Strengths: care giving, smart, walter  Trauma: n/a     Client presents via doxy vv for 2nd encounter, presents in improved space, reports \"not (being) sure how I feel. I don't know if its apathy or not allowing things to bother me\". Reports has not called out of work since our last session, this is progress. Encouraged a walk to [place]. Follow up in 2-3 weeks.        Avelina Farooq (: 1990) is a 27 y.o. female, new patient, here for evaluation of the following chief complaint(s):    Psychotherapy        ASSESSMENT/PLAN:  Below is the assessment and plan developed based on review of pertinent history, labs, studies, and medications.     1.  Adjustment disorder with mixed anxiety and depressed mood        Return in about 2-3 weeks     SUBJECTIVE/OBJECTIVE:  HPI     Review of Systems      No data recorded    Physical Exam        Comfort Emelike, was evaluated through a synchronous (real-time) audio-video encounter. The patient (or guardian if applicable) is aware that this is a billable service, which includes applicable co-pays. Verbal consent to proceed has been obtained. The visit was conducted pursuant to the emergency declaration under the [address] [place] [address] authority and the [employer] Act. Patient identification was verified, and a caregiver was present when appropriate. The patient was located at home in a state where the provider was licensed to provide care.        An electronic signature was used to authenticate this note.   -- Melinda Anne LCSW

## 2022-06-01 ENCOUNTER — VIRTUAL VISIT (OUTPATIENT)
Dept: INTERNAL MEDICINE CLINIC | Age: 32
End: 2022-06-01
Payer: COMMERCIAL

## 2022-06-01 DIAGNOSIS — R53.82 CHRONIC FATIGUE: ICD-10-CM

## 2022-06-01 DIAGNOSIS — N93.9 ABNORMAL UTERINE BLEEDING (AUB): ICD-10-CM

## 2022-06-01 DIAGNOSIS — F32.1 CURRENT MODERATE EPISODE OF MAJOR DEPRESSIVE DISORDER, UNSPECIFIED WHETHER RECURRENT (HCC): Primary | ICD-10-CM

## 2022-06-01 DIAGNOSIS — N93.9 ABNORMAL UTERINE BLEEDING (AUB): Primary | ICD-10-CM

## 2022-06-01 DIAGNOSIS — F41.1 GAD (GENERALIZED ANXIETY DISORDER): ICD-10-CM

## 2022-06-01 PROBLEM — F43.23 ADJUSTMENT DISORDER WITH MIXED ANXIETY AND DEPRESSED MOOD: Status: RESOLVED | Noted: 2022-03-03 | Resolved: 2022-06-01

## 2022-06-01 PROCEDURE — 99214 OFFICE O/P EST MOD 30 MIN: CPT | Performed by: NURSE PRACTITIONER

## 2022-06-01 RX ORDER — MEDROXYPROGESTERONE ACETATE 10 MG/1
10 TABLET ORAL DAILY
Qty: 10 TABLET | Refills: 0 | Status: SHIPPED | OUTPATIENT
Start: 2022-06-01

## 2022-06-01 NOTE — PATIENT INSTRUCTIONS
Vortioxetine (Brintellix, Trintellix) - (By mouth)   Why this medicine is used:   Treats depression. Contact a nurse or doctor right away if you have:  · Fast heartbeat, feeling more excited or energetic than usual, anxiety, restlessness  · Thoughts of hurting yourself or others, seeing or hearing things that are not there  · Trouble sleeping, unusual dreams, fever, sweating, muscle spasms, diarrhea  · Confusion, weakness, and muscle stiffness or twitching  · Unusual bleeding or bruising     Common side effects:  · Dizziness, nausea, vomiting, constipation  · Eye pain, vision changes, seeing halos around lights  · Sexual problems  © 2017 [address] is for End User's use only and may not be sold, redistributed or otherwise used for commercial purposes. Venlafaxine (Effexor, Effexor XR) - (By mouth)   Why this medicine is used:   Treats depression, generalized anxiety disorder, panic disorder, and social anxiety disorder. Contact a nurse or doctor right away if you have:  · Thoughts of hurting yourself, seeing or hearing things that are not there  · Seizures  · Bloody vomit or vomit that looks like coffee grounds; bloody or black, tarry stools  · Anxiety, restlessness, fever, vomiting, diarrhea  · Confusion, weakness, muscle twitching     Common side effects:  · Sexual problems  · Mild nausea, constipation, loss of appetite, weight loss  · Headache, sleepiness or unusual drowsiness, vision changes, sweating, dry mouth  © 2017 [facility] Information is for End User's use only and may not be sold, redistributed or otherwise used for commercial purposes. Abnormal Uterine Bleeding: Care Instructions  Overview     Abnormal uterine bleeding is irregular bleeding from the uterus. It may be bleeding that is heavier, lighter, or lasts longer than your usual period. Or it may be bleeding that doesn't occur at your regular time.  Sometimes it is caused by changes in hormone levels. It can also be caused by growths in the uterus, such as fibroids or polyps. Sometimes a cause cannot be found. You may have bleeding when you are not expecting your period. Your doctor may suggest a pregnancy test.  Follow-up care is a key part of your treatment and safety. Be sure to make and go to all appointments, and call your doctor if you are having problems. It's also a good idea to know your test results and keep a list of the medicines you take. How can you care for yourself at home? · Be safe with medicines. Take pain medicines exactly as directed. ? If the doctor gave you a prescription medicine for pain, take it as prescribed. ? If you are not taking a prescription pain medicine, ask your doctor if you can take an over-the-counter medicine. · You may be low in iron because of blood loss. Eat a balanced diet that is high in iron and vitamin C. Foods rich in iron include red meat, shellfish, eggs, beans, and leafy green vegetables. Talk to your doctor about whether you need to take iron pills or a multivitamin. When should you call for help? Call 911 anytime you think you may need emergency care. For example, call if:    · You passed out (lost consciousness). Call your doctor now or seek immediate medical care if:    · You have new or worse belly or pelvic pain.     · You have severe vaginal bleeding.     · You feel dizzy or lightheaded, or you feel like you may faint. Watch closely for changes in your health, and be sure to contact your doctor if:    · You think you may be pregnant.     · Your bleeding gets worse.     · You do not get better as expected. Where can you learn more? Go to http://www.gray.com/  Enter I327 in the search box to learn more about \"Abnormal Uterine Bleeding: Care Instructions. \"  Current as of: November 22, 2021               Content Version: 13.2  © 4411-9075 [employer].    Care instructions adapted under license by [address] (which disclaims liability or warranty for this information). If you have questions about a medical condition or this instruction, always ask your healthcare professional. [address] any warranty or liability for your use of this information.

## 2022-06-01 NOTE — PROGRESS NOTES
Ranjana Gresham is a 27 y.o. female established patient, here for evaluation of the following chief complaint(s):   Follow-up (KAREN/MDD)          Assessment & Plan:   Diagnoses and all orders for this visit:    1. Current moderate episode of major depressive disorder, unspecified whether recurrent ([facility]    2. Abnormal uterine bleeding (AUB)    3. KAREN (generalized anxiety disorder)    4. Chronic fatigue    Other orders  -     vortioxetine (Trintellix) 10 mg tablet; Take 1 Tablet by mouth daily. To replace Zoloft      Follow-up and Dispositions    · Return in about 5 weeks (around 7/6/2022) for OV- Trintellix or Effexor med change. GYN fu for AUB. .           We discussed the expected course, resolution and complications of the diagnosis(es) in detail. Medication risks, benefits, costs, interactions, and alternatives were discussed as indicated. I advised her to contact the office if her condition worsens, changes or fails to improve as anticipated. She expressed understanding with the diagnosis(es) and plan. Specific pt instructions until next visit: Sent for trintellix, if NOT approved will send for Effexor 37.5 alternatively as pt prefers med change at this time. If she is not contacted by GYN, instructed to send msg via [employer] for provera to help with AUB/irregular menses lately. Subjective:   Ranjana Gresham is a 27 y.o. female who was seen for Follow-up (KAREN/MDD)      Pt presents to f/u ADJ with mixed anxiety and depression. Taking Zoloft 50 mg. Denies side effects from medication. Feels there is another issue occurring as she is still on menses for whole month of May. Initially using 3 pads per day, stopped x 6 days, and returned ~ 2 weeks with clots and 5 heavy pads. Has nexplanon and will see GYN. However awaiting an appt and no meds sent. Feeling more tired lately and jaramillo. No acute complaints otherwise.   3 most recent PHQ Screens 6/1/2022   Little interest or pleasure in doing things More than half the days   Feeling down, depressed, irritable, or hopeless Several days   Total Score PHQ 2 3   Trouble falling or staying asleep, or sleeping too much More than half the days   Feeling tired or having little energy Nearly every day   Poor appetite, weight loss, or overeating Not at all   Feeling bad about yourself - or that you are a failure or have let yourself or your family down Several days   Trouble concentrating on things such as school, work, reading, or watching TV More than half the days   Moving or speaking so slowly that other people could have noticed; or the opposite being so fidgety that others notice Several days   Thoughts of being better off dead, or hurting yourself in some way Not at all   PHQ 9 Score 12   How difficult have these problems made it for you to do your work, take care of your home and get along with others Very difficult     KAREN 2/7 6/1/2022 5/2/2022   Feeling nervous, anxious, or on edge 2 2   Not being able to stop or control worrying 2 2   Worrying too much about different things 2 1   Trouble relaxing 2 3   Being so restless that it is hard to sit still 1 2   Becoming easily annoyed or irritable 1 0   Feeling afraid as if something awful might happen 1 1   KAREN-7 Total Score 11 11             Patient Active Problem List    Diagnosis Date Noted    Current moderate episode of major depressive disorder, unspecified whether recurrent ([facility] 06/01/2022    KAREN (generalized anxiety disorder) 06/01/2022    Bilateral pendulous breasts 03/03/2022    Chronic right shoulder pain 03/03/2022    Intractable menstrual migraine without status migrainosus 03/03/2022    Intractable migraine with aura without status migrainosus 03/03/2022    Right carpal tunnel syndrome 03/03/2022    Chronic bilateral thoracic back pain 03/03/2022    Chronic fatigue 03/03/2022    [facility] (BMI >= 40) [facility] 03/03/2022     Current Outpatient Medications   Medication Sig Dispense Refill    vortioxetine (Trintellix) 10 mg tablet Take 1 Tablet by mouth daily. To replace Zoloft 30 Tablet 2    etonogestreL (Nexplanon) 68 mg impl 68 mg.  topiramate (TOPAMAX) 50 mg tablet Take 1 Tablet by mouth two (2) times a day. 60 Tablet 2    ibuprofen (MOTRIN) 800 mg tablet Take 1 Tablet by mouth every eight (8) hours as needed for Pain. 40 Tablet 2     Allergies   Allergen Reactions    Cat Dander Hives     History reviewed. No pertinent past medical history. History reviewed. No pertinent surgical history. Review of Systems   Constitutional: Negative for fever and malaise/fatigue. Eyes: Negative for blurred vision. Respiratory: Negative for cough and shortness of breath. Cardiovascular: Negative for chest pain and leg swelling. Neurological: Negative for dizziness, weakness and headaches. Objective:   Vital Signs: (As obtained by patient/caregiver at home)  There were no vitals taken for this visit. Physical Exam:  General appearance - alert, well  appearing, and in mild distress. Mental status - A/O x 4,sad mood and FLAT affect. Eyes- no periorbital edema, drainage, or irritation noted. Nose- no obvious drainage or swelling. Throat- no obvious swelling, goiter, or notable lymphadenopathy  Chest - Symmetric chest rise. No wheezing or coughing. No distress. Skin- normal skin tone noted. No hyperpigmentation or obvious deformities. No diaphoresis noted. No flushing. Neuro - Normal speech, no focal findings or movement disorder. Other pertinent observable physical exam findings:-              Osmar Edwards is being evaluated by a Virtual Visit (video visit) encounter to address concerns as mentioned above. A caregiver was present when appropriate. Due to this being a TeleHealth encounter (During FVNUD-43 public health emergency), evaluation of the following organ systems was limited: Vitals/Constitutional/EENT/Resp/CV/GI//MS/Neuro/Skin/Heme-Lymph-Imm.   Pursuant to the emergency declaration under the [facility], [address] [place] [address] authority and the [employer] Act, this Virtual Visit was conducted with patient's (and/or legal guardian's) consent, to reduce the patient's risk of exposure to COVID-19 and provide necessary medical care. The patient (and/or legal guardian) has also been advised to contact this office for worsening conditions or problems, and seek emergency medical treatment and/or call 911 if deemed necessary. Patient identification was verified at the start of the visit: YES    Services were provided through a video synchronous discussion virtually to substitute for in-person clinic visit. Patient and provider were located at their individual homes. An electronic signature was used to authenticate this note.   -- Aruna Murray NP

## 2022-06-01 NOTE — PROGRESS NOTES
Pt Is here for   Chief Complaint   Patient presents with    Follow-up     KAREN/MDD     1. Have you been to the ER, urgent care clinic since your last visit? Hospitalized since your last visit? No    2. Have you seen or consulted any other health care providers outside of the [address] [place] [address] since your last visit? Include any pap smears or colon screening.  No

## 2023-06-06 ENCOUNTER — OFFICE VISIT (OUTPATIENT)
Facility: CLINIC | Age: 33
End: 2023-06-06
Payer: COMMERCIAL

## 2023-06-06 VITALS
BODY MASS INDEX: 47.09 KG/M2 | SYSTOLIC BLOOD PRESSURE: 147 MMHG | OXYGEN SATURATION: 99 % | TEMPERATURE: 98 F | HEIGHT: 66 IN | HEART RATE: 61 BPM | WEIGHT: 293 LBS | DIASTOLIC BLOOD PRESSURE: 91 MMHG | RESPIRATION RATE: 17 BRPM

## 2023-06-06 DIAGNOSIS — R03.0 ELEVATED BP WITHOUT DIAGNOSIS OF HYPERTENSION: ICD-10-CM

## 2023-06-06 DIAGNOSIS — Z71.3 WEIGHT LOSS COUNSELING, ENCOUNTER FOR: ICD-10-CM

## 2023-06-06 PROCEDURE — 99213 OFFICE O/P EST LOW 20 MIN: CPT | Performed by: NURSE PRACTITIONER

## 2023-06-06 RX ORDER — ALBUTEROL SULFATE 90 UG/1
AEROSOL, METERED RESPIRATORY (INHALATION)
COMMUNITY

## 2023-06-06 RX ORDER — VALACYCLOVIR HYDROCHLORIDE 500 MG/1
500 TABLET, FILM COATED ORAL DAILY
COMMUNITY
Start: 2022-08-18

## 2023-06-06 RX ORDER — PHENTERMINE HYDROCHLORIDE 37.5 MG/1
TABLET ORAL
COMMUNITY
Start: 2023-01-26 | End: 2023-06-07 | Stop reason: ALTCHOICE

## 2023-06-06 SDOH — ECONOMIC STABILITY: FOOD INSECURITY: WITHIN THE PAST 12 MONTHS, THE FOOD YOU BOUGHT JUST DIDN'T LAST AND YOU DIDN'T HAVE MONEY TO GET MORE.: NEVER TRUE

## 2023-06-06 SDOH — ECONOMIC STABILITY: FOOD INSECURITY: WITHIN THE PAST 12 MONTHS, YOU WORRIED THAT YOUR FOOD WOULD RUN OUT BEFORE YOU GOT MONEY TO BUY MORE.: NEVER TRUE

## 2023-06-06 SDOH — ECONOMIC STABILITY: HOUSING INSECURITY
IN THE LAST 12 MONTHS, WAS THERE A TIME WHEN YOU DID NOT HAVE A STEADY PLACE TO SLEEP OR SLEPT IN A SHELTER (INCLUDING NOW)?: NO

## 2023-06-06 SDOH — ECONOMIC STABILITY: INCOME INSECURITY: HOW HARD IS IT FOR YOU TO PAY FOR THE VERY BASICS LIKE FOOD, HOUSING, MEDICAL CARE, AND HEATING?: NOT HARD AT ALL

## 2023-06-06 ASSESSMENT — PATIENT HEALTH QUESTIONNAIRE - PHQ9
SUM OF ALL RESPONSES TO PHQ QUESTIONS 1-9: 13
SUM OF ALL RESPONSES TO PHQ9 QUESTIONS 1 & 2: 3
8. MOVING OR SPEAKING SO SLOWLY THAT OTHER PEOPLE COULD HAVE NOTICED. OR THE OPPOSITE, BEING SO FIGETY OR RESTLESS THAT YOU HAVE BEEN MOVING AROUND A LOT MORE THAN USUAL: 0
7. TROUBLE CONCENTRATING ON THINGS, SUCH AS READING THE NEWSPAPER OR WATCHING TELEVISION: 3
6. FEELING BAD ABOUT YOURSELF - OR THAT YOU ARE A FAILURE OR HAVE LET YOURSELF OR YOUR FAMILY DOWN: 0
SUM OF ALL RESPONSES TO PHQ QUESTIONS 1-9: 13
4. FEELING TIRED OR HAVING LITTLE ENERGY: 3
10. IF YOU CHECKED OFF ANY PROBLEMS, HOW DIFFICULT HAVE THESE PROBLEMS MADE IT FOR YOU TO DO YOUR WORK, TAKE CARE OF THINGS AT HOME, OR GET ALONG WITH OTHER PEOPLE: 2
5. POOR APPETITE OR OVEREATING: 2
9. THOUGHTS THAT YOU WOULD BE BETTER OFF DEAD, OR OF HURTING YOURSELF: 0
2. FEELING DOWN, DEPRESSED OR HOPELESS: 1
3. TROUBLE FALLING OR STAYING ASLEEP: 2
1. LITTLE INTEREST OR PLEASURE IN DOING THINGS: 2

## 2023-06-06 NOTE — PROGRESS NOTES
medications for this visit. Vitals:    06/06/23 1514 06/06/23 1521   BP: (!) 144/95 (!) 147/91   Site: Right Upper Arm Left Upper Arm   Position: Sitting Sitting   Cuff Size: Large Adult Large Adult   Pulse: 78 61   Resp: 17    Temp: 98 °F (36.7 °C)    TempSrc: Temporal    SpO2: 99%    Weight: (!) 314 lb (142.4 kg)    Height: 5' 6\" (1.676 m)        Wt Readings from Last 3 Encounters:   06/06/23 (!) 314 lb (142.4 kg)   03/03/22 300 lb (136.1 kg)       Physical Exam:   General appearance - alert, well appearing, and in no distress. Mental status - A/O x 4,normal mood and affect. Chest - CAT. Symmetric chest rise. No wheezing. No distress. Heart - Normal rate & rhythm. Normal S1 & S2. No MGR. Abdomen- Soft, round. Non-distended, NT. No pulsatile masses or hernias. Ext-  No pedal edema, clubbing, or cyanosis. Skin-Warm and dry. No hyperpigmentation, ulcerations, or suspicious lesions. Neuro - Normal speech, no focal findings or movement disorder. Normal strength, gait, and muscle tone. An electronic signature was used to authenticate this note.   -- Jeanie Rudd NP

## 2023-06-06 NOTE — PATIENT INSTRUCTIONS
Call your insurance company to see if bariatric/obesity medicine visits are COVERED. Then check to see if each of these meds are covered and the OUT OF POCKET cost to you for each if covered. Kristy Sorrel the ones that are and cross the ones that are NOT) KELSEYY, QSYMIA, SAXENDA, PLENITY, CONTRAVE, or ADIPEX are covered. Then let me know which you are able to get and we can determine the best fit at your next appt.

## 2023-06-08 DIAGNOSIS — Z71.3 WEIGHT LOSS COUNSELING, ENCOUNTER FOR: ICD-10-CM

## 2023-06-08 DIAGNOSIS — R03.0 ELEVATED BP WITHOUT DIAGNOSIS OF HYPERTENSION: ICD-10-CM

## 2023-06-08 RX ORDER — SEMAGLUTIDE 0.25 MG/.5ML
0.25 INJECTION, SOLUTION SUBCUTANEOUS
Qty: 2 ML | Refills: 0 | Status: SHIPPED | OUTPATIENT
Start: 2023-06-08 | End: 2023-07-08

## 2023-06-08 RX ORDER — SEMAGLUTIDE 0.5 MG/.5ML
0.5 INJECTION, SOLUTION SUBCUTANEOUS
Qty: 2 ML | Refills: 2 | Status: SHIPPED | OUTPATIENT
Start: 2023-07-08 | End: 2023-10-06

## 2023-07-14 DIAGNOSIS — Z71.3 WEIGHT LOSS COUNSELING, ENCOUNTER FOR: ICD-10-CM

## 2023-07-14 RX ORDER — PHENTERMINE AND TOPIRAMATE 7.5; 46 MG/1; MG/1
1 CAPSULE, EXTENDED RELEASE ORAL DAILY
Qty: 90 CAPSULE | Refills: 3 | Status: SHIPPED | OUTPATIENT
Start: 2023-07-14 | End: 2024-07-13

## 2023-07-14 RX ORDER — PHENTERMINE AND TOPIRAMATE 3.75; 23 MG/1; MG/1
1 CAPSULE, EXTENDED RELEASE ORAL DAILY
Qty: 30 CAPSULE | Refills: 0 | Status: SHIPPED | OUTPATIENT
Start: 2023-07-14 | End: 2023-08-13

## 2023-07-19 ENCOUNTER — TELEPHONE (OUTPATIENT)
Facility: CLINIC | Age: 33
End: 2023-07-19

## 2023-07-19 NOTE — TELEPHONE ENCOUNTER
Patient needs a PA on:     Phentermine-Topiramate (QSYMIA) 7.5-46 MG CP24      Phentermine-Topiramate (QSYMIA) 3.75-23 MG CP24

## 2024-01-03 DIAGNOSIS — R03.0 ELEVATED BP WITHOUT DIAGNOSIS OF HYPERTENSION: ICD-10-CM

## 2024-01-03 DIAGNOSIS — Z71.3 WEIGHT LOSS COUNSELING, ENCOUNTER FOR: ICD-10-CM

## 2024-01-03 RX ORDER — SEMAGLUTIDE 0.25 MG/.5ML
0.25 INJECTION, SOLUTION SUBCUTANEOUS
Qty: 2 ML | Refills: 0 | OUTPATIENT
Start: 2024-01-03 | End: 2024-02-02

## 2024-01-03 RX ORDER — SEMAGLUTIDE 0.5 MG/.5ML
0.5 INJECTION, SOLUTION SUBCUTANEOUS
Qty: 2 ML | Refills: 2 | OUTPATIENT
Start: 2024-01-03 | End: 2024-04-02

## 2024-01-03 NOTE — TELEPHONE ENCOUNTER
COMFORT EMELIKE (Key: [id number])  Rx #: [id number]  Wegovy 0.25MG/0.5ML auto-injectors  Form  Brittany Electronic PA Form (2017 NCPDP)  Created  7 days ago  Sent to Plan  6 days ago  Plan Response  6 days ago  Submit Clinical Questions  6 days ago  Determination  Favorable  5 days ago  Your prior authorization for Wegovy has been approved!

## 2024-01-03 NOTE — TELEPHONE ENCOUNTER
Patient would like to be called to see if she can get  an authorization for Wegovy since it has been unable until recently. Patient an be reached  [phone].

## 2024-05-20 ENCOUNTER — TELEMEDICINE (OUTPATIENT)
Facility: CLINIC | Age: 34
End: 2024-05-20
Payer: COMMERCIAL

## 2024-05-20 DIAGNOSIS — Z71.3 WEIGHT LOSS COUNSELING, ENCOUNTER FOR: ICD-10-CM

## 2024-05-20 DIAGNOSIS — R03.0 ELEVATED BP WITHOUT DIAGNOSIS OF HYPERTENSION: ICD-10-CM

## 2024-05-20 PROCEDURE — 99214 OFFICE O/P EST MOD 30 MIN: CPT | Performed by: NURSE PRACTITIONER

## 2024-05-20 RX ORDER — SEMAGLUTIDE 1 MG/.5ML
1 INJECTION, SOLUTION SUBCUTANEOUS
Qty: 2 ML | Refills: 2 | Status: SHIPPED | OUTPATIENT
Start: 2024-05-20

## 2024-05-20 RX ORDER — SEMAGLUTIDE 0.5 MG/.5ML
0.5 INJECTION, SOLUTION SUBCUTANEOUS
Qty: 2 ML | Refills: 2 | Status: CANCELLED | OUTPATIENT
Start: 2024-05-20 | End: 2024-08-18

## 2024-05-20 NOTE — PROGRESS NOTES
Ju Hollis is a 34 y.o. female Established patient, here for evaluation of the following chief complaint(s):   Medication Refill          Assessment & Plan:   Below is the assessment and plan developed based on review of pertinent history, physical exam, labs, studies, and medications.     Diagnosis Orders   1. BMI 50.0-59.9, adult (HCC)  Semaglutide-Weight Management (WEGOVY) 1 MG/0.5ML SOAJ SC injection      2. Weight loss counseling, encounter for  Semaglutide-Weight Management (WEGOVY) 1 MG/0.5ML SOAJ SC injection      3. Elevated BP without diagnosis of hypertension  Semaglutide-Weight Management (WEGOVY) 1 MG/0.5ML SOAJ SC injection          MDM: INCREASED dose to 1 mg. Pt tolerated 0.5 mg without GI effects and LSC maintained with MH addressed.           Follow-up and Dispositions    Return for Keep appt as scheduled.         Specific pt instructions until next visit: call if any problems    Subjective:   Ju Hollis is a 34 y.o. female who was seen for Medication Refill      Weight Loss Counseling:  Pt here to discuss elevated BMI. Would like to lose weight. Has seen the dietitian. Eating 3 x daily, following meal plan of protein-rich (boiled eggs, turkey sausage, or oikos protien with pecans and berries. 120 oz of water). NO alcohol since last year. Adds premier protein into coffee in the mornings. Exercising 3 weekly x 30 minutes with lifting once weekly PLUS walking 20 min at work. Followed by psych now too, taking wellbutrin 300 mg and adderall 20 mg XR. Helped with productivity, no longer feels she is in a crisis space. Goal wt is 200-220 lbs, has not lost any wt with Wegovy start. Just received WEGOVY 0.5 mg on 2/29/24 from order sent with last visit in June. Would often get the run around with pharmacy when attempting to . No SE with use.   Last Weight Metrics:      6/6/2023     3:14 PM 3/3/2022     8:34 AM   Weight Loss Metrics   Height 5' 6\" 5' 6\"   Weight - Scale 314 lbs 300 lbs

## 2024-05-20 NOTE — PROGRESS NOTES
Pt is here for   Chief Complaint   Patient presents with    Medication Refill     \"Have you been to the ER, urgent care clinic since your last visit?  Hospitalized since your last visit?\"    NO    “Have you seen or consulted any other health care providers outside of [facility] since your last visit?”    NO     “Have you had a pap smear?”    NO    Date of last Cervical Cancer screen (HPV or PAP): 4/10/2019             Click Here for Release of Records Request

## 2024-05-20 NOTE — PATIENT INSTRUCTIONS
Try eating high protein in the mornings, reserving your fruits for the evening time when your cortisol levels are lower.

## 2024-08-16 ENCOUNTER — HOSPITAL ENCOUNTER (OUTPATIENT)
Facility: HOSPITAL | Age: 34
Discharge: HOME | End: 2024-08-16
Payer: COMMERCIAL

## 2024-08-16 ENCOUNTER — OFFICE VISIT (OUTPATIENT)
Facility: CLINIC | Age: 34
End: 2024-08-16

## 2024-08-16 ENCOUNTER — CLINICAL DOCUMENTATION (OUTPATIENT)
Facility: CLINIC | Age: 34
End: 2024-08-16

## 2024-08-16 VITALS
BODY MASS INDEX: 47.09 KG/M2 | HEIGHT: 66 IN | RESPIRATION RATE: 18 BRPM | OXYGEN SATURATION: 100 % | TEMPERATURE: 98.5 F | HEART RATE: 88 BPM | SYSTOLIC BLOOD PRESSURE: 140 MMHG | WEIGHT: 293 LBS | DIASTOLIC BLOOD PRESSURE: 92 MMHG

## 2024-08-16 DIAGNOSIS — Z13.220 SCREENING FOR LIPID DISORDERS: ICD-10-CM

## 2024-08-16 DIAGNOSIS — N93.9 ABNORMAL UTERINE BLEEDING (AUB): ICD-10-CM

## 2024-08-16 DIAGNOSIS — L70.0 ACNE VULGARIS: ICD-10-CM

## 2024-08-16 DIAGNOSIS — Z13.29 SCREENING FOR ENDOCRINE, NUTRITIONAL, METABOLIC AND IMMUNITY DISORDER: ICD-10-CM

## 2024-08-16 DIAGNOSIS — M25.511 CHRONIC RIGHT SHOULDER PAIN: ICD-10-CM

## 2024-08-16 DIAGNOSIS — Z00.00 ADULT GENERAL MEDICAL EXAMINATION: ICD-10-CM

## 2024-08-16 DIAGNOSIS — Z11.4 SCREENING FOR HIV WITHOUT PRESENCE OF RISK FACTORS: ICD-10-CM

## 2024-08-16 DIAGNOSIS — G89.29 CHRONIC RIGHT SHOULDER PAIN: ICD-10-CM

## 2024-08-16 DIAGNOSIS — Z71.3 WEIGHT LOSS COUNSELING, ENCOUNTER FOR: ICD-10-CM

## 2024-08-16 DIAGNOSIS — Z13.0 SCREENING FOR ENDOCRINE, NUTRITIONAL, METABOLIC AND IMMUNITY DISORDER: ICD-10-CM

## 2024-08-16 DIAGNOSIS — Z13.228 SCREENING FOR ENDOCRINE, NUTRITIONAL, METABOLIC AND IMMUNITY DISORDER: ICD-10-CM

## 2024-08-16 DIAGNOSIS — Z11.59 NEED FOR HEPATITIS C SCREENING TEST: ICD-10-CM

## 2024-08-16 DIAGNOSIS — R06.83 SNORING: ICD-10-CM

## 2024-08-16 DIAGNOSIS — Z00.00 ADULT GENERAL MEDICAL EXAMINATION: Primary | ICD-10-CM

## 2024-08-16 DIAGNOSIS — Z71.89 ADVANCE CARE PLANNING: ICD-10-CM

## 2024-08-16 DIAGNOSIS — Z13.21 SCREENING FOR ENDOCRINE, NUTRITIONAL, METABOLIC AND IMMUNITY DISORDER: ICD-10-CM

## 2024-08-16 LAB
ALBUMIN SERPL-MCNC: 3.7 G/DL (ref 3.5–5)
ALBUMIN/GLOB SERPL: 0.9 (ref 1.1–2.2)
ALP SERPL-CCNC: 104 U/L (ref 45–117)
ALT SERPL-CCNC: 21 U/L (ref 12–78)
ANION GAP SERPL CALC-SCNC: 3 MMOL/L (ref 5–15)
AST SERPL-CCNC: 8 U/L (ref 15–37)
BASOPHILS # BLD: 0 K/UL (ref 0–0.1)
BASOPHILS NFR BLD: 1 % (ref 0–1)
BILIRUB SERPL-MCNC: 0.2 MG/DL (ref 0.2–1)
BUN SERPL-MCNC: 17 MG/DL (ref 6–20)
BUN/CREAT SERPL: 15 (ref 12–20)
CALCIUM SERPL-MCNC: 9 MG/DL (ref 8.5–10.1)
CHLORIDE SERPL-SCNC: 110 MMOL/L (ref 97–108)
CHOLEST SERPL-MCNC: 132 MG/DL
CO2 SERPL-SCNC: 28 MMOL/L (ref 21–32)
CREAT SERPL-MCNC: 1.15 MG/DL (ref 0.55–1.02)
DIFFERENTIAL METHOD BLD: ABNORMAL
EOSINOPHIL # BLD: 0.2 K/UL (ref 0–0.4)
EOSINOPHIL NFR BLD: 4 % (ref 0–7)
ERYTHROCYTE [DISTWIDTH] IN BLOOD BY AUTOMATED COUNT: 14.6 % (ref 11.5–14.5)
EST. AVERAGE GLUCOSE BLD GHB EST-MCNC: 108 MG/DL
GLOBULIN SER CALC-MCNC: 3.9 G/DL (ref 2–4)
GLUCOSE SERPL-MCNC: 92 MG/DL (ref 65–100)
HBA1C MFR BLD: 5.4 % (ref 4–5.6)
HCT VFR BLD AUTO: 41.9 % (ref 35–47)
HDLC SERPL-MCNC: 53 MG/DL
HDLC SERPL: 2.5 (ref 0–5)
HGB BLD-MCNC: 13.3 G/DL (ref 11.5–16)
HIV 1+2 AB+HIV1 P24 AG SERPL QL IA: NONREACTIVE
HIV 1/2 RESULT COMMENT: NORMAL
IMM GRANULOCYTES # BLD AUTO: 0 K/UL (ref 0–0.04)
IMM GRANULOCYTES NFR BLD AUTO: 0 % (ref 0–0.5)
LDLC SERPL CALC-MCNC: 69.8 MG/DL (ref 0–100)
LYMPHOCYTES # BLD: 2 K/UL (ref 0.8–3.5)
LYMPHOCYTES NFR BLD: 45 % (ref 12–49)
MCH RBC QN AUTO: 26.5 PG (ref 26–34)
MCHC RBC AUTO-ENTMCNC: 31.7 G/DL (ref 30–36.5)
MCV RBC AUTO: 83.6 FL (ref 80–99)
MONOCYTES # BLD: 0.2 K/UL (ref 0–1)
MONOCYTES NFR BLD: 5 % (ref 5–13)
NEUTS SEG # BLD: 2 K/UL (ref 1.8–8)
NEUTS SEG NFR BLD: 45 % (ref 32–75)
NRBC # BLD: 0 K/UL (ref 0–0.01)
NRBC BLD-RTO: 0 PER 100 WBC
PLATELET # BLD AUTO: 267 K/UL (ref 150–400)
PMV BLD AUTO: 11.7 FL (ref 8.9–12.9)
POTASSIUM SERPL-SCNC: 4.3 MMOL/L (ref 3.5–5.1)
PROT SERPL-MCNC: 7.6 G/DL (ref 6.4–8.2)
RBC # BLD AUTO: 5.01 M/UL (ref 3.8–5.2)
SODIUM SERPL-SCNC: 141 MMOL/L (ref 136–145)
TRIGL SERPL-MCNC: 46 MG/DL
TSH SERPL DL<=0.05 MIU/L-ACNC: 1.09 UIU/ML (ref 0.36–3.74)
VLDLC SERPL CALC-MCNC: 9.2 MG/DL
WBC # BLD AUTO: 4.3 K/UL (ref 3.6–11)

## 2024-08-16 PROCEDURE — 73030 X-RAY EXAM OF SHOULDER: CPT

## 2024-08-16 RX ORDER — TRETINOIN 0.5 MG/G
CREAM TOPICAL
Qty: 45 G | Refills: 1 | Status: SHIPPED | OUTPATIENT
Start: 2024-08-16 | End: 2024-09-15

## 2024-08-16 RX ORDER — ACETAMINOPHEN 500 MG
1000 TABLET ORAL 3 TIMES DAILY PRN
Qty: 180 TABLET | Refills: 5 | Status: SHIPPED | OUTPATIENT
Start: 2024-08-16

## 2024-08-16 RX ORDER — METHOCARBAMOL 500 MG/1
500 TABLET, FILM COATED ORAL 4 TIMES DAILY PRN
Qty: 360 TABLET | Refills: 1 | Status: SHIPPED | OUTPATIENT
Start: 2024-08-16

## 2024-08-16 RX ORDER — BUPROPION HYDROCHLORIDE 300 MG/1
300 TABLET ORAL EVERY MORNING
COMMUNITY
Start: 2024-05-30

## 2024-08-16 RX ORDER — SEMAGLUTIDE 2.4 MG/.75ML
2.4 INJECTION, SOLUTION SUBCUTANEOUS
Qty: 0.75 ML | Refills: 5 | Status: SHIPPED | OUTPATIENT
Start: 2024-09-15

## 2024-08-16 RX ORDER — SEMAGLUTIDE 1.7 MG/.75ML
1.7 INJECTION, SOLUTION SUBCUTANEOUS
Qty: 0.75 ML | Refills: 0 | Status: SHIPPED | OUTPATIENT
Start: 2024-08-16

## 2024-08-16 RX ORDER — DICLOFENAC SODIUM 75 MG/1
75 TABLET, DELAYED RELEASE ORAL 2 TIMES DAILY WITH MEALS
Qty: 180 TABLET | Refills: 1 | Status: SHIPPED | OUTPATIENT
Start: 2024-08-16

## 2024-08-16 RX ORDER — DEXTROAMPHETAMINE SACCHARATE, AMPHETAMINE ASPARTATE MONOHYDRATE, DEXTROAMPHETAMINE SULFATE AND AMPHETAMINE SULFATE 7.5; 7.5; 7.5; 7.5 MG/1; MG/1; MG/1; MG/1
1 CAPSULE, EXTENDED RELEASE ORAL EVERY MORNING
COMMUNITY
Start: 2024-07-28

## 2024-08-16 SDOH — ECONOMIC STABILITY: FOOD INSECURITY: WITHIN THE PAST 12 MONTHS, THE FOOD YOU BOUGHT JUST DIDN'T LAST AND YOU DIDN'T HAVE MONEY TO GET MORE.: NEVER TRUE

## 2024-08-16 SDOH — ECONOMIC STABILITY: INCOME INSECURITY: HOW HARD IS IT FOR YOU TO PAY FOR THE VERY BASICS LIKE FOOD, HOUSING, MEDICAL CARE, AND HEATING?: NOT HARD AT ALL

## 2024-08-16 SDOH — ECONOMIC STABILITY: FOOD INSECURITY: WITHIN THE PAST 12 MONTHS, YOU WORRIED THAT YOUR FOOD WOULD RUN OUT BEFORE YOU GOT MONEY TO BUY MORE.: NEVER TRUE

## 2024-08-16 ASSESSMENT — PATIENT HEALTH QUESTIONNAIRE - PHQ9
9. THOUGHTS THAT YOU WOULD BE BETTER OFF DEAD, OR OF HURTING YOURSELF: NOT AT ALL
7. TROUBLE CONCENTRATING ON THINGS, SUCH AS READING THE NEWSPAPER OR WATCHING TELEVISION: SEVERAL DAYS
SUM OF ALL RESPONSES TO PHQ9 QUESTIONS 1 & 2: 1
8. MOVING OR SPEAKING SO SLOWLY THAT OTHER PEOPLE COULD HAVE NOTICED. OR THE OPPOSITE, BEING SO FIGETY OR RESTLESS THAT YOU HAVE BEEN MOVING AROUND A LOT MORE THAN USUAL: NOT AT ALL
2. FEELING DOWN, DEPRESSED OR HOPELESS: SEVERAL DAYS
4. FEELING TIRED OR HAVING LITTLE ENERGY: NOT AT ALL
3. TROUBLE FALLING OR STAYING ASLEEP: NOT AT ALL
SUM OF ALL RESPONSES TO PHQ QUESTIONS 1-9: 2
5. POOR APPETITE OR OVEREATING: NOT AT ALL
1. LITTLE INTEREST OR PLEASURE IN DOING THINGS: NOT AT ALL
SUM OF ALL RESPONSES TO PHQ QUESTIONS 1-9: 2
6. FEELING BAD ABOUT YOURSELF - OR THAT YOU ARE A FAILURE OR HAVE LET YOURSELF OR YOUR FAMILY DOWN: NOT AT ALL

## 2024-08-16 NOTE — PROGRESS NOTES
Approved  PA Detail   Prior authorization approved  Payer: [facility] Employee Program FEP Case ID: [id number]    [phone]  Note from payer: Your PA request has been approved. Additional information will be provided in the approval communication. (Message 1146)  Approval Details    Authorized from July 17, 2024 to August 16, 2025      tretinoin (RETIN-A) 0.05 % cream  Apply topically nightly.

## 2024-08-16 NOTE — ACP (ADVANCE CARE PLANNING)
Advanced care planning- discussed Advance directive, Medical POA, and life sustaining options. Advised of free virtual or in-person visit for advance care planning paperwork completion with ACP specialist. Referreal sent.     Advance Care Planning (ACP) Provider Conversation Snapshot    Date of ACP Conversation: 08/16/24  Persons included in Conversation:  Patient/family  Length of ACP Conversation in minutes:  5-10 minutes    Authorized Decision Maker (if patient is incapable of making informed decisions):   This person is:   Healthcare Agent/Medical Power of  under Advance Directive        For Patients with Decision Making Capacity:   Intubation, CPR, use of IVF/Nutrition, Tube Feedings, and organ donation options reviewed briefly    Conversation Outcomes / Follow-Up Plan:   Recommended completion of Advance Directive form after review of ACP materials and conversation with prospective healthcare agent     Referral made for ACP follow-up assistance to:  ACP facilitator/specialist    ====Advance Care Planning Invitation====    Patient was invited to begin or continue Advance Care Planning on this date and reviewed ACP materials in the office OR discussed in detail during virtual visit.    Recommended appointment with facilitator for ACP conversation regarding advance directives.    [x] Yes  [] No  Referral sent to ACP team member or Coordinator for follow-up    [] Yes  [x] No  Patient scheduled an appointment.       Site of Referral: [facility]

## 2024-08-16 NOTE — PROGRESS NOTES
Ju Hollis is a 34 y.o. female presenting for annual checkup.     Specific concerns today: reason she is still NOT losing weight and has heavy menses. Had ultrasounds and told she has several fibroids, but not shrinking on various birth controls. Followed by same GYN since 2009, Dr. ALEX Hennessy in [place]. Deferred ablation to avoid sterilization since nulliparous. Scheduled to see ENDO to look into this.     Weight Loss Counseling:  Pt here to discuss elevated BMI. Would like to lose weight. Has seen the dietitian. Eating 4 x daily, following meal plan of high protein, low-carb. Exercising 2 weekly x 30 minutes, improved with wellbutrin start. Goal wt is 200 lbs.  Last Weight Metrics:      8/16/2024    10:47 AM 6/6/2023     3:14 PM 3/3/2022     8:34 AM   Weight Loss Metrics   Height 5' 6\" 5' 6\" 5' 6\"   Weight - Scale 302 lbs 314 lbs 300 lbs   BMI (Calculated) 48.8 kg/m2 50.8 kg/m2 48.5 kg/m2       ROS: Feeling well. No dyspnea or chest pain on exertion. No abdominal pain, change in bowel habits, black or bloody stools. Last BM: yesterday, [place]#3. Averages 3-4 BMs every 7 days with Wegovy start. Averages drinking 120 oz of water daily. No urinary tract. No neurological complaints.     Review of Systems  Constitutional: +dry mouth. negative for fevers, chills, anorexia and weight loss  Eyes:   negative for visual disturbance, drainage, and irritation  ENT:   negative for tinnitus,sore throat,nasal congestion,ear pain,and hoarseness  Respiratory:  negative for cough, hemoptysis, dyspnea, and wheezing  CV:   negative for chest pain, palpitations, and lower extremity edema  GI:   negative for nausea, vomiting, diarrhea, abdominal pain, and melena  Endo:               negative for polyuria,polydipsia,polyphagia, and heat intolerance  Genitourinary: negative for frequency, urgency, dysuria, retention, and hematuria  Integument:  +seborrheic dermatitis and facial acne. negative for rash, ulcerations, and  Patient called to schedule an appointment  , has a referral to see Dermatology for full skin check , denies any spots of concern . I offered the fist available and added her to our cancellation list

## 2024-08-17 LAB
HCV RNA SERPL NAA+PROBE-ACNC: NORMAL IU/ML
TEST INFORMATION: NORMAL

## 2024-08-19 ENCOUNTER — CLINICAL DOCUMENTATION (OUTPATIENT)
Dept: SPIRITUAL SERVICES | Age: 34
End: 2024-08-19

## 2024-08-19 NOTE — ACP (ADVANCE CARE PLANNING)
Advance Care Planning   Ambulatory ACP Specialist Patient Outreach    Date:  8/19/2024    ACP Specialist:  Sury Luis    Outreach call to patient in follow-up to ACP Specialist referral from: Karon Eddy APRN - NP    [x] PCP  [] Provider   [] Ambulatory Care Management [] Other     For:                  [x] Advance Directive Assistance              [] Complete Portable DNR order              [] Complete POST/POLST/MOST              [] Code Status Discussion             [] Discuss Goals of Care             [] Early ACP Decision-Making              [] Other (Specify)    Date Referral Received: 8/16/2024    Next Step:   [x] ACP scheduled conversation  [] Outreach again in one week               [x] Email / Mail ACP Info Sheets  [x] Email / Mail Advance Directive   [] Closing referral.  Routing closure to referring provider/staff and to ACP Specialist .    [] Closure letter mailed to patient with invitation to contact ACP Specialist if / when ready.   [] Other (Specify here):       [x] At this time, Healthcare Decision Maker Is:         Primary Decision Maker: Darin Hollis - [facility] - [phone]      [] Primary agent named in scanned advance directive.    [x] Legal Next of Kin.     [] Unable to determine legal decision maker at this time.    Outreaches:       [x] 1st -  Date:  8/19/2024               Intervention:  [x] Spoke with Patient   [] Left Voice mail [] Email / Mail    [] [employer]  [] Other (Specify) :     Outcomes:  Writer attempted ACP outreach to the one number listed for both, patient's home and mobile ([phone]) - spoke to patient.  Patient is agreeable to a conversation with ACP Specialist Aliya Hennessy on Friday, 8/30/2024, at 9:30 AM.  A copy of [facility] and ACP information sheets sent to patient's confirmed email address on file with ACP Specialist and Coordinator's contact information included.           Thank you for this referral.

## 2024-08-19 NOTE — RESULT ENCOUNTER NOTE
Overall your labs look good, but...    Your kidney functioning is IMPAIRED/Decreasing, this may be due to DEHYDRATION or an acute illness. Be sure to stay HYDRATED, drinking 64 oz of fluid daily, ideally water. Also avoid regular or daily use of NSAIDs (BC powder, Ibuprofen, Advill, Motrin, and Aleve), as these may decrease your kidney functioning further or be the cause of the impairment. Try topical agents, heat,  or Tylenol for pain. There is no cause of concern, you do NOT have chronic kidney disease at this time and don't any other treatments besides the above mentioned changes. We will monitor this and recheck it at your next appointment.

## 2024-08-20 LAB — LPA SERPL-SCNC: 167.2 NMOL/L

## 2024-08-30 ENCOUNTER — CLINICAL DOCUMENTATION (OUTPATIENT)
Dept: CASE MANAGEMENT | Age: 34
End: 2024-08-30

## 2024-08-30 NOTE — ACP (ADVANCE CARE PLANNING)
Advance Care Planning   Ambulatory ACP Specialist Patient Outreach    Date:  8/30/2024    ACP Specialist:  Debbie Hennessy LCSW    Outreach call to patient in follow-up to ACP Specialist referral from:Karon Eddy APRN - NP    [x] PCP  [] Provider   [] Ambulatory Care Management [] Other       For:                  [x] Advance Directive Assistance              [] Complete Portable DNR order              [] Complete POST/POLST/MOST              [] Code Status Discussion             [] Discuss Goals of Care             [] Early ACP Decision-Making              [] Other (Specify)     Date Referral Received: 8/16/2024    Next Step:   [] ACP scheduled conversation  [x] Outreach again in one week               [] Email / Mail ACP Info Sheets  [] Email / Mail Advance Directive   [] Closing referral.  Routing closure to referring provider/staff and to ACP Specialist .    [] Closure letter mailed to patient with invitation to contact ACP Specialist if / when ready.   [] Other (Specify here):       [x] At this time, Healthcare Decision Maker Is:         [] Primary agent named in scanned advance directive.    [x] Legal Next of Kin-   Primary Decision Maker: Darin Hollis [facility] - [phone]    [] Unable to determine legal decision maker at this time.    Outreaches:        [x]  Additional Outreach -  Date:  8/30/2024   (Specify Dates & special circumstances): Left voicemail message for patient    Outcomes: ACP Specialist called patient on the mobile/home number listed in the medical record for scheduled ACP appointment. Pt did not answer telephone call and voice message was left for patient requesting a return telephone call to reschedule this ACP appointment.  ACP Specialist will call patient again next week to determine if another appointment is desired if patient has not called back in this time frame.     Thank you for this referral.    Debbie Hennessy LCSW, LISW-CP  Advance Care Planning  [place] [facility]  [phone]

## 2024-09-11 ENCOUNTER — CLINICAL DOCUMENTATION (OUTPATIENT)
Dept: CASE MANAGEMENT | Age: 34
End: 2024-09-11

## 2024-09-20 ENCOUNTER — CLINICAL DOCUMENTATION (OUTPATIENT)
Dept: CASE MANAGEMENT | Age: 34
End: 2024-09-20

## 2024-09-27 ENCOUNTER — OFFICE VISIT (OUTPATIENT)
Facility: CLINIC | Age: 34
End: 2024-09-27

## 2024-09-27 VITALS
OXYGEN SATURATION: 96 % | HEART RATE: 106 BPM | SYSTOLIC BLOOD PRESSURE: 143 MMHG | HEIGHT: 66 IN | RESPIRATION RATE: 18 BRPM | DIASTOLIC BLOOD PRESSURE: 84 MMHG | BODY MASS INDEX: 47.09 KG/M2 | WEIGHT: 293 LBS | TEMPERATURE: 97.9 F

## 2024-09-27 DIAGNOSIS — R03.0 ELEVATED BP WITHOUT DIAGNOSIS OF HYPERTENSION: ICD-10-CM

## 2024-09-27 DIAGNOSIS — R00.0 TACHYCARDIA: ICD-10-CM

## 2024-09-27 DIAGNOSIS — G47.26 SHIFTING SLEEP-WORK SCHEDULE: ICD-10-CM

## 2024-09-27 DIAGNOSIS — Z71.3 WEIGHT LOSS COUNSELING, ENCOUNTER FOR: ICD-10-CM

## 2024-11-11 ENCOUNTER — OFFICE VISIT (OUTPATIENT)
Facility: CLINIC | Age: 34
End: 2024-11-11

## 2024-11-11 VITALS
HEIGHT: 66 IN | TEMPERATURE: 98 F | WEIGHT: 293 LBS | DIASTOLIC BLOOD PRESSURE: 80 MMHG | BODY MASS INDEX: 47.09 KG/M2 | HEART RATE: 78 BPM | OXYGEN SATURATION: 100 % | SYSTOLIC BLOOD PRESSURE: 132 MMHG | RESPIRATION RATE: 17 BRPM

## 2024-11-11 DIAGNOSIS — Z71.3 WEIGHT LOSS COUNSELING, ENCOUNTER FOR: ICD-10-CM

## 2024-11-11 DIAGNOSIS — R00.0 TACHYCARDIA: ICD-10-CM

## 2024-11-11 DIAGNOSIS — M25.511 CHRONIC RIGHT SHOULDER PAIN: ICD-10-CM

## 2024-11-11 DIAGNOSIS — G89.29 CHRONIC RIGHT SHOULDER PAIN: ICD-10-CM

## 2024-11-11 DIAGNOSIS — R03.0 ELEVATED BP WITHOUT DIAGNOSIS OF HYPERTENSION: ICD-10-CM

## 2024-11-11 NOTE — PROGRESS NOTES
Pt is here for   Chief Complaint   Patient presents with    Shoulder Pain     Right shoulder      \"Have you been to the ER, urgent care clinic since your last visit?  Hospitalized since your last visit?\"    NO    “Have you seen or consulted any other health care providers outside our system since your last visit?”    NO     “Have you had a pap smear?”    NO    Date of last Cervical Cancer screen (HPV or PAP): 4/10/2019             
mg 1 each by IntraUTERine route once    albuterol sulfate HFA (PROVENTIL;VENTOLIN;PROAIR) 108 (90 Base) MCG/ACT inhaler Ventolin HFA 90 mcg/actuation aerosol inhaler   INHALE 2 PUFFS BY MOUTH 1 TO 4 TIMES DAILY AS NEEDED     No current facility-administered medications for this visit.       Vitals:    11/11/24 1543   BP: 132/80   Site: Left Upper Arm   Position: Sitting   Cuff Size: Large Adult   Pulse: 78   Resp: 17   Temp: 98 °F (36.7 °C)   TempSrc: Temporal   SpO2: 100%   Weight: 133.4 kg (294 lb)   Height: 1.676 m (5' 6\")       Wt Readings from Last 3 Encounters:   11/11/24 133.4 kg (294 lb)   09/30/24 134.3 kg (296 lb)   09/27/24 134.4 kg (296 lb 3.2 oz)       Physical Exam:   General appearance - alert, well appearing, and in no distress.  Mental status - A/O x 4,normal mood and affect.   Chest -  Symmetric chest rise. No wheezing. No distress.  Heart - Normal rate.  Abdomen- Soft, round. Non-distended, NT. No pulsatile masses or hernias.  Ext-  No pedal edema, clubbing, or cyanosis.  Skin-Warm and dry. No hyperpigmentation, ulcerations, or suspicious lesions.  Neuro - Normal speech, no focal findings or movement disorder. Normal strength, gait, and muscle tone.     An electronic signature was used to authenticate this note.  -- Karon Eddy NP

## 2024-11-11 NOTE — PATIENT INSTRUCTIONS
Look into [facility] as an option, if not covered by insurance, you may wish to consider a reputable compound pharmacy.

## 2024-11-12 ENCOUNTER — PATIENT MESSAGE (OUTPATIENT)
Facility: CLINIC | Age: 34
End: 2024-11-12

## 2024-11-12 DIAGNOSIS — Z71.3 WEIGHT LOSS COUNSELING, ENCOUNTER FOR: ICD-10-CM

## 2024-11-12 RX ORDER — TIRZEPATIDE 10 MG/.5ML
10 INJECTION, SOLUTION SUBCUTANEOUS WEEKLY
Qty: 2 ML | Refills: 2 | Status: SHIPPED | OUTPATIENT
Start: 2024-11-12 | End: 2024-11-12 | Stop reason: SDUPTHER

## 2024-11-12 RX ORDER — TIRZEPATIDE 10 MG/.5ML
10 INJECTION, SOLUTION SUBCUTANEOUS WEEKLY
Qty: 2 ML | Refills: 2 | Status: SHIPPED | OUTPATIENT
Start: 2024-11-12

## 2024-11-18 DIAGNOSIS — Z71.3 WEIGHT LOSS COUNSELING, ENCOUNTER FOR: ICD-10-CM

## 2024-11-18 RX ORDER — LIRAGLUTIDE 6 MG/ML
INJECTION, SOLUTION SUBCUTANEOUS
Qty: 3 ML | Refills: 5 | Status: SHIPPED | OUTPATIENT
Start: 2024-11-18

## 2024-11-27 ENCOUNTER — CLINICAL DOCUMENTATION (OUTPATIENT)
Facility: CLINIC | Age: 34
End: 2024-11-27

## 2024-11-27 NOTE — PROGRESS NOTES
Approved  PA Detail   Prior authorization approved  Payer: [facility] Employee [facility] Case ID: [id number]    [phone]  Note from payer: Your PA request has been approved. Additional information will be provided in the approval communication. (Message 1123)  Approval Details    Authorized from October 20, 2024 to May 18, 2025

## 2025-02-09 ASSESSMENT — SLEEP AND FATIGUE QUESTIONNAIRES
DO YOU TAKE NAPS: NO
DO YOU HAVE DIFFICULTY WATCHING A MOVIE OR VIDEO BECAUSE YOU BECOME SLEEPY OR TIRED: YES, MODERATE
DO YOU GET TOO LITTLE SLEEP AT NIGHT: NO
HOW LIKELY ARE YOU TO NOD OFF OR FALL ASLEEP WHILE LYING DOWN TO REST IN THE AFTERNOON WHEN CIRCUMSTANCES PERMIT: HIGH CHANCE OF DOZING
HOW LIKELY ARE YOU TO NOD OFF OR FALL ASLEEP WHILE SITTING QUIETLY AFTER LUNCH WITHOUT ALCOHOL: HIGH CHANCE OF DOZING
ARE YOU BOTHERED BY WAKING UP TOO EARLY AND NOT BEING ABLE TO GET BACK TO SLEEP: YES
HOW LIKELY ARE YOU TO NOD OFF OR FALL ASLEEP WHILE SITTING AND READING: SLIGHT CHANCE OF DOZING
SELECT ANY OF THE FOLLOWING BEHAVIORS OBSERVED WHILE YOU ARE ASLEEP: GRINDING TEETH
DO YOU HAVE DIFFICULTY VISITING YOUR FAMILY OR FRIENDS IN THEIR HOME BECAUSE YOU BECOME SLEEPY OR TIRED: NO
HOW LIKELY ARE YOU TO NOD OFF OR FALL ASLEEP WHILE SITTING AND TALKING TO SOMEONE: WOULD NEVER DOZE
DO YOU GENERALLY HAVE DIFFICULTY REMEMBERING THINGS BECAUSE YOU ARE SLEEPY OR TIRED: YES, MODERATE
DO YOU HAVE DIFFICULTY OPERATING A MOTOR VEHICLE FOR LONG DISTANCES (GREATER THAN 100 MILES) BECAUSE YOU BECOME SLEEPY: NO
DO YOU HAVE DIFFICULTY BEING AS ACTIVE AS YOU WANT TO BE IN THE EVENING BECAUSE YOU ARE SLEEPY OR TIRED: YES, MODERATE
HOW LIKELY ARE YOU TO NOD OFF OR FALL ASLEEP WHILE SITTING INACTIVE IN A PUBLIC PLACE: SLIGHT CHANCE OF DOZING
AVERAGE NUMBER OF SLEEP HOURS: 6
HOW LIKELY ARE YOU TO NOD OFF OR FALL ASLEEP WHILE WATCHING TV: HIGH CHANCE OF DOZING
HAS YOUR RELATIONSHIP WITH FAMILY, FRIENDS OR WORK COLLEAGUES BEEN AFFECTED BECAUSE YOU ARE SLEEPY OR TIRED: NO
HOW LIKELY ARE YOU TO NOD OFF OR FALL ASLEEP WHILE SITTING AND READING: SLIGHT CHANCE OF DOZING
DO YOU HAVE DIFFICULTY BEING AS ACTIVE AS YOU WANT TO BE IN THE MORNING BECAUSE YOU ARE SLEEPY OR TIRED: YES, MODERATE
HAS YOUR MOOD BEEN AFFECTED BECAUSE YOU ARE SLEEPY OR TIRED: YES, LITTLE
NUMBER OF TIMES YOU WAKE PER NIGHT: 3
HOW LIKELY ARE YOU TO NOD OFF OR FALL ASLEEP WHILE SITTING AND TALKING TO SOMEONE: WOULD NEVER DOZE
HOW LIKELY ARE YOU TO NOD OFF OR FALL ASLEEP WHEN YOU ARE A PASSENGER IN A CAR FOR AN HOUR WITHOUT A BREAK: HIGH CHANCE OF DOZING
DO YOU HAVE DIFFICULTY CONCENTRATING ON THE THINGS YOU DO BECAUSE YOU ARE SLEEPY OR TIRED: YES, MODERATE
HOW LIKELY ARE YOU TO NOD OFF OR FALL ASLEEP WHILE SITTING INACTIVE IN A PUBLIC PLACE: SLIGHT CHANCE OF DOZING
HOW LIKELY ARE YOU TO NOD OFF OR FALL ASLEEP WHILE SITTING QUIETLY AFTER LUNCH WITHOUT ALCOHOL: HIGH CHANCE OF DOZING
ESS TOTAL SCORE: 14
AVERAGE NUMBER OF SLEEP HOURS: 6
SELECT ANY OF THE FOLLOWING BEHAVIORS OBSERVED WHILE PATIENT ASLEEP: LOUD SNORING;GRINDING TEETH
DO YOU HAVE PROBLEMS WITH FREQUENT AWAKENINGS AT NIGHT: YES
FOSQ SCORE: 14.5
HOW LIKELY ARE YOU TO NOD OFF OR FALL ASLEEP WHILE WATCHING TV: HIGH CHANCE OF DOZING
HOW LIKELY ARE YOU TO NOD OFF OR FALL ASLEEP IN A CAR, WHILE STOPPED FOR A FEW MINUTES IN TRAFFIC: WOULD NEVER DOZE
HOW LIKELY ARE YOU TO NOD OFF OR FALL ASLEEP WHILE LYING DOWN TO REST IN THE AFTERNOON WHEN CIRCUMSTANCES PERMIT: HIGH CHANCE OF DOZING
DO YOU HAVE DIFFICULTY OPERATING A MOTOR VEHICLE FOR SHORT DISTANCES (LESS THAN 100 MILES) BECAUSE YOU BECOME SLEEPY: NO
WHAT TIME DO YOU USUALLY GO TO BED: 10:30
ARE YOU BOTHERED BY WAKING UP TOO EARLY AND NOT BEING ABLE TO GET BACK TO SLEEP: YES
WHAT SHIFT DO YOU WORK: FIRST SHIFT
SELECT ANY OF THE FOLLOWING BEHAVIORS OBSERVED WHILE YOU ARE ASLEEP: LOUD SNORING
HOW LIKELY ARE YOU TO NOD OFF OR FALL ASLEEP WHEN YOU ARE A PASSENGER IN A CAR FOR AN HOUR WITHOUT A BREAK: HIGH CHANCE OF DOZING
DO YOU GET TOO LITTLE SLEEP AT NIGHT: NO
DO YOU WORK SHIFTS: YES
HOW LIKELY ARE YOU TO NOD OFF OR FALL ASLEEP IN A CAR, WHILE STOPPED FOR A FEW MINUTES IN TRAFFIC: WOULD NEVER DOZE

## 2025-02-10 ENCOUNTER — OFFICE VISIT (OUTPATIENT)
Age: 35
End: 2025-02-10
Payer: COMMERCIAL

## 2025-02-10 VITALS
DIASTOLIC BLOOD PRESSURE: 87 MMHG | HEART RATE: 98 BPM | HEIGHT: 66 IN | BODY MASS INDEX: 47.09 KG/M2 | SYSTOLIC BLOOD PRESSURE: 136 MMHG | WEIGHT: 293 LBS | OXYGEN SATURATION: 95 % | TEMPERATURE: 98.2 F

## 2025-02-10 DIAGNOSIS — G47.33 OBSTRUCTIVE SLEEP APNEA (ADULT) (PEDIATRIC): Primary | ICD-10-CM

## 2025-02-10 DIAGNOSIS — E66.01 SEVERE OBESITY (BMI >= 40): ICD-10-CM

## 2025-02-10 PROCEDURE — 99204 OFFICE O/P NEW MOD 45 MIN: CPT | Performed by: INTERNAL MEDICINE

## 2025-02-10 NOTE — PATIENT INSTRUCTIONS
[address]  [place] [address]  Tel.  [phone]  Fax. [phone] [address]  [place] [address]  Tel.  [phone]  Fax. [phone] [address] [place] [address]  Tel.  [phone]  Fax. [phone]     Sleep Apnea: After Your Visit  Your Care Instructions  Sleep apnea occurs when you frequently stop breathing for 10 seconds or longer during sleep. It can be mild to severe, based on the number of times per hour that you stop breathing or have slowed breathing. Blocked or narrowed airways in your nose, mouth, or throat can cause sleep apnea. Your airway can become blocked when your throat muscles and tongue relax during sleep.  Sleep apnea is common, occurring in 1 out of 20 individuals.  Individuals having any of the following characteristics should be evaluated and treated right away due to high risk and detrimental consequences from untreated sleep apnea:  Obesity  Congestive Heart failure  Atrial Fibrillation  Uncontrolled Hypertension  Type II Diabetes  Night-time Arrhythmias  Stroke  Pulmonary Hypertension  High-risk Driving Populations (pilots, truck drivers, etc.)  Patients Considering Weight-loss Surgery    How do you know you have sleep apnea?  You probably have sleep apnea if you answer 'yes' to 3 or more of the following questions:  S - Have you been told that you Snore?   T - Are you often Tired during the day?  O - Has anyone Observed you stop breathing while sleeping?  P- Do you have (or are being treated for) high blood Pressure?    B - Are you obese (Body Mass Index > 35)?  A - Is your Age 50 years old or older?  N - Is your Neck size greater than 16 inches?  G - Are you male Gender?  A sleep physician can prescribe a breathing device that prevents tissues in the throat from blocking your airway. Or your doctor may recommend using a dental device (oral breathing device) to help keep your airway open. In some cases, surgery may

## 2025-02-10 NOTE — PROGRESS NOTES
\                   [address]  [place] [address]  Tel.  [phone]  Fax. [phone] [address]  [place] [address]  Tel.  [phone]  Fax. [phone] [address] [place] [address]  Tel.  [phone]  Fax. [phone]         Subjective:      Ju Hollis is an 34 y.o. female referred for evaluation for a sleep disorder. She complains of difficulty losing weight associated with snoring.  Symptoms began several years ago, unchanged since that time. She usually can fall asleep in 30 minutes.  Family or house members note snoring. She denies falling asleep while driving.  Comfort Emelike does wake up frequently at night. She is bothered by waking up too early and left unable to get back to sleep. She actually sleeps about 6 hours at night and wakes up about 3 times during the night. She does work shifts: First Shift.   Comfort indicates she does not get too little sleep at night. Her bedtime is 1030. She awakens at 0515. She does not take naps.  . She has the following observed behaviors: Loud snoring, Grinding teeth;  .  Other remarks:          2/10/2025    10:38 AM 2/9/2025    10:56 AM   Sleep Medicine   Sitting and reading  1   Watching TV  3   Sitting, inactive in a public place (e.g. a theatre or a meeting)  1   As a passenger in a car for an hour without a break  3   Lying down to rest in the afternoon when circumstances permit  3   Sitting and talking to someone  0   Sitting quietly after a lunch without alcohol  3   In a car, while stopped for a few minutes in traffic  0   [place] Sleepiness Score  14   Neck (Inches) 16     which reflect moderate daytime drowsiness.    Allergies   Allergen Reactions    Cat Dander Hives         Current Outpatient Medications:     buPROPion (WELLBUTRIN XL) 300 MG extended release tablet, Take 1 tablet by mouth every morning, Disp: , Rfl:     amphetamine-dextroamphetamine (ADDERALL XR) 30 MG extended release capsule,

## 2025-02-19 ENCOUNTER — CLINICAL DOCUMENTATION (OUTPATIENT)
Facility: CLINIC | Age: 35
End: 2025-02-19

## 2025-02-19 NOTE — PROGRESS NOTES
Approved  PA Detail   Prior authorization approved  Payer: [facility] Employee Program [facility] Case ID: [id number]    [phone]  Note from payer: Your PA request has been approved. Additional information will be provided in the approval communication. (Message 1145)  Approval Details    Authorized from January 11, 2025 to February 10, 2026    The original prescription was discontinued on 2/10/2025 by Vernell Poe MD for the following reason: LIST CLEANUP.

## 2025-03-10 ENCOUNTER — PROCEDURE VISIT (OUTPATIENT)
Age: 35
End: 2025-03-10

## 2025-03-10 ENCOUNTER — HOSPITAL ENCOUNTER (OUTPATIENT)
Facility: HOSPITAL | Age: 35
Discharge: HOME | End: 2025-03-13
Payer: COMMERCIAL

## 2025-03-10 DIAGNOSIS — G47.33 OBSTRUCTIVE SLEEP APNEA (ADULT) (PEDIATRIC): ICD-10-CM

## 2025-03-10 DIAGNOSIS — G47.33 OBSTRUCTIVE SLEEP APNEA (ADULT) (PEDIATRIC): Primary | ICD-10-CM

## 2025-03-10 PROCEDURE — 95800 SLP STDY UNATTENDED: CPT | Performed by: INTERNAL MEDICINE

## 2025-03-10 NOTE — PROGRESS NOTES
S>Ju Hollis is a 34 y.o. female seen today to receive a home sleep testing unit (HST).  Patient will be bringing device back on 3/14/25 due to work schedule (approved before appt).      Patient was educated on proper hookup and operation of the HST via detailed instruction sheet .  Instruction forms with after hours contact and documentation were signed.    O>    There were no vitals taken for this visit.      A>  No diagnosis found.      P>  General information regarding operations and maintenance of the device was provided.  Treatment options, including CPAP masks types and OAT were shown to patient.  Follow-up appointment was made to return the HST. She will be contacted once the results have been reviewed in 10-15 business days.  She was asked to contact our office for any problems regarding her home sleep test study.

## 2025-03-20 ENCOUNTER — CLINICAL DOCUMENTATION (OUTPATIENT)
Age: 35
End: 2025-03-20

## 2025-03-20 NOTE — PROGRESS NOTES
HSAT in r-drive.    Tech to convey results to patient          Your test showed mild sleep apnea.    Apnea/hypopnea index was 11/hour. Very mild(lowest 91%) oxygen desaturations. Intermittent signficant snoring noted.   (Most pronounced when on right side)  Along with weight loss, treatment options include      CPAP would take care of all apneas and snoring regardless of position of sleep. If she  is agreeable to use this at night when sleeping , I will order it  and see her after she  has used it a month or so. CPAP is the gold standard for treatment of sleep apnea.   Mouthpiece/dental device-can reduce apneas but they do not work well if sleeping on back so the positioning device is needed with this option in addition to the mouthpiece. Generally, snoring will be reduced but not eliminated.   Avoiding all sleeping on back. I would recommend she  do this with assistance of a positioning device which looks like a big fannypack that is worn to bed to keep off back position (Slumber bump or sleep noodle). These can be purchased online or can be made by taking a fannypack and stuffing it with 2-3 tennis balls.

## 2025-03-24 ENCOUNTER — TELEPHONE (OUTPATIENT)
Age: 35
End: 2025-03-24

## 2025-03-25 ENCOUNTER — TELEPHONE (OUTPATIENT)
Age: 35
End: 2025-03-25

## 2025-03-26 NOTE — TELEPHONE ENCOUNTER
Reviewed sleep study results.  Patient states she can't do CPAP due to severe claustraphobia and she doesn't sleep on her back.  She is interested in the OAT, however, she is getting [facility] dental in a few months.  Patient did ask about the new weight loss medication.  Informed patient that our office does not prescribe this medication and her PCP could prescribe this.    Please write referral for dental.    Please fax dental referral and schedule follow-up visit in 3-4 months.    Thanks

## 2025-04-07 ENCOUNTER — CLINICAL DOCUMENTATION (OUTPATIENT)
Age: 35
End: 2025-04-07

## 2025-04-21 ENCOUNTER — OFFICE VISIT (OUTPATIENT)
Facility: CLINIC | Age: 35
End: 2025-04-21
Payer: COMMERCIAL

## 2025-04-21 VITALS
HEART RATE: 92 BPM | OXYGEN SATURATION: 96 % | WEIGHT: 292 LBS | SYSTOLIC BLOOD PRESSURE: 123 MMHG | HEIGHT: 66 IN | BODY MASS INDEX: 46.93 KG/M2 | RESPIRATION RATE: 16 BRPM | DIASTOLIC BLOOD PRESSURE: 86 MMHG | TEMPERATURE: 97.5 F

## 2025-04-21 DIAGNOSIS — Z20.2 STD EXPOSURE: ICD-10-CM

## 2025-04-21 DIAGNOSIS — Z02.0 SCHOOL PHYSICAL EXAM: Primary | ICD-10-CM

## 2025-04-21 DIAGNOSIS — G47.30 MILD SLEEP APNEA: ICD-10-CM

## 2025-04-21 DIAGNOSIS — Z71.3 WEIGHT LOSS COUNSELING, ENCOUNTER FOR: ICD-10-CM

## 2025-04-21 DIAGNOSIS — Z11.1 SCREENING FOR TUBERCULOSIS: ICD-10-CM

## 2025-04-21 PROCEDURE — 99395 PREV VISIT EST AGE 18-39: CPT | Performed by: NURSE PRACTITIONER

## 2025-04-21 RX ORDER — LISDEXAMFETAMINE DIMESYLATE 40 MG/1
1 CAPSULE ORAL EVERY MORNING
COMMUNITY
Start: 2025-04-11

## 2025-04-21 ASSESSMENT — PATIENT HEALTH QUESTIONNAIRE - PHQ9
3. TROUBLE FALLING OR STAYING ASLEEP: NOT AT ALL
10. IF YOU CHECKED OFF ANY PROBLEMS, HOW DIFFICULT HAVE THESE PROBLEMS MADE IT FOR YOU TO DO YOUR WORK, TAKE CARE OF THINGS AT HOME, OR GET ALONG WITH OTHER PEOPLE: NOT DIFFICULT AT ALL
9. THOUGHTS THAT YOU WOULD BE BETTER OFF DEAD, OR OF HURTING YOURSELF: NOT AT ALL
SUM OF ALL RESPONSES TO PHQ QUESTIONS 1-9: 0
5. POOR APPETITE OR OVEREATING: NOT AT ALL
2. FEELING DOWN, DEPRESSED OR HOPELESS: NOT AT ALL
SUM OF ALL RESPONSES TO PHQ QUESTIONS 1-9: 0
1. LITTLE INTEREST OR PLEASURE IN DOING THINGS: NOT AT ALL
SUM OF ALL RESPONSES TO PHQ QUESTIONS 1-9: 0
8. MOVING OR SPEAKING SO SLOWLY THAT OTHER PEOPLE COULD HAVE NOTICED. OR THE OPPOSITE, BEING SO FIGETY OR RESTLESS THAT YOU HAVE BEEN MOVING AROUND A LOT MORE THAN USUAL: NOT AT ALL
7. TROUBLE CONCENTRATING ON THINGS, SUCH AS READING THE NEWSPAPER OR WATCHING TELEVISION: NOT AT ALL
6. FEELING BAD ABOUT YOURSELF - OR THAT YOU ARE A FAILURE OR HAVE LET YOURSELF OR YOUR FAMILY DOWN: NOT AT ALL
SUM OF ALL RESPONSES TO PHQ QUESTIONS 1-9: 0
4. FEELING TIRED OR HAVING LITTLE ENERGY: NOT AT ALL

## 2025-04-21 NOTE — PROGRESS NOTES
Ju Hollis 1990 is a 35 y.o. female, Established patient, here for evaluation of the following chief complaint(s):  School Physical and STD Testing  (States started having sex again)      The patient (or guardian, if applicable) and other individuals in attendance with the patient were advised that Artificial Intelligence will be utilized during this visit to record, process the conversation to generate a clinical note, and support improvement of the AI technology. The patient (or guardian, if applicable) and other individuals in attendance at the appointment consented to the use of AI, including the recording. There may be incorrect pronoun references transcribed based on AI determining this from the recorded conversation.     ASSESSMENT/PLAN:  Below is the assessment and plan developed based on review of pertinent history, physical exam, labs, studies, and medications.       Diagnosis Orders   1. School physical exam        2. BMI 45.0-49.9, adult        3. Mild sleep apnea        4. STD exposure  Chlamydia, Gonorrhea, Trichomoniasis      5. Screening for tuberculosis  tuberculin injection 5 Units      6. Weight loss counseling, encounter for            Assessment & Plan  1. Mild sleep apnea.  - The patient's weight loss efforts are likely being hindered by her mild sleep apnea, which is exacerbated by her current weight.  - The interrelationship between these two conditions is negatively impacting her overall health.  - She was informed about the availability of a nasal inhaler as an alternative to the oral CPAP device. She was also advised to monitor her condition closely.  - She is currently opting to delay further intervention until after her orthodontic treatment is completed.    2. Weight management.  - Her weight has decreased by 4 pounds since the last visit, indicating progress in the desired direction.  - Blood pressure readings are within normal range today at 123/86 mmHg, and her pulse rate is

## 2025-04-21 NOTE — PROGRESS NOTES
Pt is here for   Chief Complaint   Patient presents with    School Physical    STD Testing      States started having sex again     \"Have you been to the ER, urgent care clinic since your last visit?  Hospitalized since your last visit?\"    NO    “Have you seen or consulted any other health care providers outside our system since your last visit?”    NO     “Have you had a pap smear?”    NO    Date of last Cervical Cancer screen (HPV or PAP): 4/10/2019              rx tobradex drops x 2 weeks and maxitrol ointment.

## 2025-04-24 ENCOUNTER — RESULTS FOLLOW-UP (OUTPATIENT)
Facility: CLINIC | Age: 35
End: 2025-04-24

## 2025-04-24 LAB
C TRACH RRNA SPEC QL NAA+PROBE: NEGATIVE
N GONORRHOEA RRNA SPEC QL NAA+PROBE: NEGATIVE
SPECIMEN SOURCE: NORMAL
T VAGINALIS RRNA SPEC QL NAA+PROBE: NEGATIVE

## 2025-04-25 ENCOUNTER — CLINICAL DOCUMENTATION (OUTPATIENT)
Facility: CLINIC | Age: 35
End: 2025-04-25

## 2025-04-25 DIAGNOSIS — G47.30 MILD SLEEP APNEA: Primary | ICD-10-CM

## 2025-04-25 DIAGNOSIS — Z71.3 WEIGHT LOSS COUNSELING, ENCOUNTER FOR: ICD-10-CM

## 2025-04-25 RX ORDER — TIRZEPATIDE 10 MG/.5ML
10 INJECTION, SOLUTION SUBCUTANEOUS WEEKLY
Qty: 2 ML | Refills: 2 | Status: SHIPPED | OUTPATIENT
Start: 2025-04-25

## 2025-04-25 NOTE — PROGRESS NOTES
Approved  PA Detail   Prior authorization approved  Payer: [facility] Employee Program [facility] Case ID: [id number]    [phone]  Note from payer: Your PA request has been approved. Additional information will be provided in the approval communication. (Message 1145)  Approval Details    Authorized from March 26, 2025 to October 22, 2025    tirzepatide-weight management (ZEPBOUND) 10 MG/0.5ML SOAJ subCUTAneous auto-injector pen  Inject 10 mg into the skin once a week

## 2025-06-03 ENCOUNTER — PATIENT MESSAGE (OUTPATIENT)
Facility: CLINIC | Age: 35
End: 2025-06-03

## 2025-06-03 DIAGNOSIS — G47.30 MILD SLEEP APNEA: ICD-10-CM

## 2025-06-03 DIAGNOSIS — Z71.3 WEIGHT LOSS COUNSELING, ENCOUNTER FOR: ICD-10-CM

## 2025-06-10 ENCOUNTER — TELEPHONE (OUTPATIENT)
Facility: CLINIC | Age: 35
End: 2025-06-10